# Patient Record
Sex: FEMALE | Race: WHITE | NOT HISPANIC OR LATINO | Employment: FULL TIME
[De-identification: names, ages, dates, MRNs, and addresses within clinical notes are randomized per-mention and may not be internally consistent; named-entity substitution may affect disease eponyms.]

---

## 2019-12-13 ENCOUNTER — TELEPHONE (OUTPATIENT)
Dept: SCHEDULING | Age: 49
End: 2019-12-13

## 2020-11-02 ENCOUNTER — HOSPITAL ENCOUNTER (EMERGENCY)
Facility: HOSPITAL | Age: 50
Discharge: HOME | End: 2020-11-02
Attending: EMERGENCY MEDICINE
Payer: COMMERCIAL

## 2020-11-02 VITALS
WEIGHT: 170 LBS | HEIGHT: 62 IN | OXYGEN SATURATION: 93 % | HEART RATE: 52 BPM | BODY MASS INDEX: 31.28 KG/M2 | DIASTOLIC BLOOD PRESSURE: 79 MMHG | RESPIRATION RATE: 16 BRPM | TEMPERATURE: 98 F | SYSTOLIC BLOOD PRESSURE: 119 MMHG

## 2020-11-02 DIAGNOSIS — M62.830 BACK SPASM: Primary | ICD-10-CM

## 2020-11-02 PROCEDURE — 96374 THER/PROPH/DIAG INJ IV PUSH: CPT

## 2020-11-02 PROCEDURE — 99284 EMERGENCY DEPT VISIT MOD MDM: CPT

## 2020-11-02 PROCEDURE — 96375 TX/PRO/DX INJ NEW DRUG ADDON: CPT

## 2020-11-02 PROCEDURE — 81003 URINALYSIS AUTO W/O SCOPE: CPT | Performed by: EMERGENCY MEDICINE

## 2020-11-02 RX ORDER — HYDROCODONE BITARTRATE AND ACETAMINOPHEN 5; 325 MG/1; MG/1
1 TABLET ORAL EVERY 6 HOURS PRN
Qty: 10 TABLET | Refills: 0 | Status: SHIPPED | OUTPATIENT
Start: 2020-11-02 | End: 2020-11-09

## 2020-11-02 RX ORDER — LIDOCAINE 50 MG/G
1 PATCH TOPICAL EVERY 24 HOURS
Qty: 15 PATCH | Refills: 0 | Status: SHIPPED | OUTPATIENT
Start: 2020-11-02

## 2020-11-02 RX ORDER — ESCITALOPRAM OXALATE 20 MG/1
20 TABLET ORAL DAILY
COMMUNITY

## 2020-11-02 RX ORDER — MORPHINE SULFATE 4 MG/ML
4 INJECTION, SOLUTION INTRAMUSCULAR; INTRAVENOUS ONCE
Status: COMPLETED | OUTPATIENT
Start: 2020-11-02 | End: 2020-11-02

## 2020-11-02 RX ORDER — KETOROLAC TROMETHAMINE 10 MG/1
10 TABLET, FILM COATED ORAL EVERY 6 HOURS PRN
Qty: 20 TABLET | Refills: 0 | Status: SHIPPED | OUTPATIENT
Start: 2020-11-02 | End: 2020-11-09

## 2020-11-02 RX ORDER — CYCLOBENZAPRINE HCL 10 MG
10 TABLET ORAL 3 TIMES DAILY PRN
Qty: 20 TABLET | Refills: 0 | Status: SHIPPED | OUTPATIENT
Start: 2020-11-02 | End: 2020-11-09

## 2020-11-02 RX ORDER — DIAZEPAM 5 MG/ML
2.5 INJECTION, SOLUTION INTRAMUSCULAR; INTRAVENOUS ONCE
Status: COMPLETED | OUTPATIENT
Start: 2020-11-02 | End: 2020-11-02

## 2020-11-02 NOTE — ED PROVIDER NOTES
Patient Seen in: [facility] AND [facility] Emergency Department      History   Patient presents with:  Back Pain    Stated Complaint: lower back pain    HPI    50-year-old female with no significant past medical history here with complaints of acute onset left-s (Oral)   Resp 16   Ht 157.5 cm (5' 2\")   Wt 77.1 kg   SpO2 94%   BMI 31.09 kg/m²         Physical Exam  Vitals signs and nursing note reviewed. HENT:      Head: Normocephalic.       Mouth/Throat:      Mouth: Mucous membranes are moist.   Eyes:      Extra all the ED vitals  - afebrile, hemodynamically stable  - I ordered and personally reviewed the labs and imaging and found no bacteriuria/hematuria  - morphine/valium/lidoderm patch applied  - pain mildly improved  - supportive care discussed    Medical Rec spasms. Qty: 20 tablet Refills: 0    lidocaine 5 % External Patch  Place 1 patch onto the skin daily.   Qty: 15 patch Refills: 0

## 2020-11-02 NOTE — ED INITIAL ASSESSMENT (HPI)
The patient arrived complaining of severe stabbing spasms to her mid to left lower back and nausea since yesterday with report of mid lower back aching 5 days ago that occurred while doing yard work. The patient denies urinary complaints.

## 2020-11-02 NOTE — ED NOTES
PT safe to DC home per MD. Elena Nobles to dress self. DC teaching done, pt verbalizes understanding. Ambulatory with steady gait to exit.

## 2021-04-06 ENCOUNTER — IMMUNIZATION (OUTPATIENT)
Dept: LAB | Age: 51
End: 2021-04-06

## 2021-04-06 DIAGNOSIS — Z23 NEED FOR VACCINATION: Primary | ICD-10-CM

## 2021-04-06 PROCEDURE — 0001A COVID 19 PFIZER-BIONTECH: CPT

## 2021-04-06 PROCEDURE — 91300 COVID 19 PFIZER-BIONTECH: CPT

## 2021-04-28 ENCOUNTER — IMMUNIZATION (OUTPATIENT)
Dept: LAB | Age: 51
End: 2021-04-28
Attending: HOSPITALIST

## 2021-04-28 DIAGNOSIS — Z23 NEED FOR VACCINATION: Primary | ICD-10-CM

## 2021-04-28 PROCEDURE — 0002A COVID 19 PFIZER-BIONTECH: CPT | Performed by: NURSE PRACTITIONER

## 2021-04-28 PROCEDURE — 91300 COVID 19 PFIZER-BIONTECH: CPT | Performed by: NURSE PRACTITIONER

## 2021-11-01 ENCOUNTER — TELEPHONE (OUTPATIENT)
Dept: GASTROENTEROLOGY | Facility: CLINIC | Age: 51
End: 2021-11-01

## 2021-11-01 ENCOUNTER — OFFICE VISIT (OUTPATIENT)
Dept: GASTROENTEROLOGY | Facility: CLINIC | Age: 51
End: 2021-11-01
Payer: COMMERCIAL

## 2021-11-01 VITALS
BODY MASS INDEX: 33.68 KG/M2 | HEIGHT: 62 IN | DIASTOLIC BLOOD PRESSURE: 70 MMHG | WEIGHT: 183 LBS | SYSTOLIC BLOOD PRESSURE: 124 MMHG

## 2021-11-01 DIAGNOSIS — K52.832 LYMPHOCYTIC COLITIS: Primary | ICD-10-CM

## 2021-11-01 DIAGNOSIS — R11.0 NAUSEA: ICD-10-CM

## 2021-11-01 DIAGNOSIS — K21.9 GASTROESOPHAGEAL REFLUX DISEASE, UNSPECIFIED WHETHER ESOPHAGITIS PRESENT: ICD-10-CM

## 2021-11-01 DIAGNOSIS — Z12.11 SCREEN FOR COLON CANCER: Primary | ICD-10-CM

## 2021-11-01 DIAGNOSIS — K52.9 CHRONIC DIARRHEA: ICD-10-CM

## 2021-11-01 PROCEDURE — 3008F BODY MASS INDEX DOCD: CPT | Performed by: INTERNAL MEDICINE

## 2021-11-01 PROCEDURE — 3074F SYST BP LT 130 MM HG: CPT | Performed by: INTERNAL MEDICINE

## 2021-11-01 PROCEDURE — 99244 OFF/OP CNSLTJ NEW/EST MOD 40: CPT | Performed by: INTERNAL MEDICINE

## 2021-11-01 PROCEDURE — 3078F DIAST BP <80 MM HG: CPT | Performed by: INTERNAL MEDICINE

## 2021-11-01 RX ORDER — SODIUM PICOSULFATE, MAGNESIUM OXIDE, AND ANHYDROUS CITRIC ACID 10; 3.5; 12 MG/160ML; G/160ML; G/160ML
1 LIQUID ORAL ONCE
Qty: 1 EACH | Refills: 0 | Status: SHIPPED | OUTPATIENT
Start: 2021-11-01 | End: 2021-11-01

## 2021-11-01 RX ORDER — BUDESONIDE 3 MG/1
CAPSULE, COATED PELLETS ORAL
Qty: 90 CAPSULE | Refills: 1 | Status: SHIPPED | OUTPATIENT
Start: 2021-11-01

## 2021-11-01 NOTE — PATIENT INSTRUCTIONS
GI schedulers –    Please schedule colonoscopy exam at [facility] [address] [facility]    Body mass index is 33.47 kg/m².     MAC anesthesia if EOSC or NELM    Clenpiq small volume bowel prep if covered by insurance, otherwise   Golytely (PEG)

## 2021-11-01 NOTE — TELEPHONE ENCOUNTER
Scheduled for:  Colonoscopy 87773  Provider Name:  Dr. Yann Magaña  Date:  2/8/2022  Location:  [facility]  Sedation:  MAC  Time:  10:30 am, arrival 9:30 am  Prep:  Clenpiq  Meds/Allergies Reconciled?:  Physician reviewed  Diagnosis with codes:  Screening for colon ca

## 2021-11-01 NOTE — PROGRESS NOTES
HPI:    Patient ID: Kimberly Bundy is a 45year old woman with elevated BMI 33.5, looking very healthy who gave up smoking in 2016. Abdominal surgical history is limited to a total abdominal hysterectomy 2014.     Ms. Salvador Morgan is referred to us by Dr. Kajal Emery mucus  · Abdominal cramping and severe bodily, muscular cramping including nocturnal leg and calf cramps  · GERD symptoms and nausea accompany the colitis flares    Likely lactose intolerance; dairy will frequently trigger diarrhea    Family history signif Dysplasia of cervix (uteri)   cryo age 23   • Lymphocytic colitis 6/16/2015   • Tinea corporis 6/21/2015     History     Social History   • Marital Status:    Spouse Name: N/A   Number of Children: 1   • Years of Education: N/A     Occupational Hist splenic flexure, transverse colon,  hepatic flexure, ascending colon, cecum, appendiceal orifice, ileocecal valve  and terminal ileum are normal. This was biopsied to evaluate for  microscopic colitis.   - Atrophic and fissured appearing rectal mucosa, with stomach flu. Thinks she had similar symptoms in her 25s. Had c/scope and was diagnosed with lymphocytic colitis. Was given six week course of budesonide and noted significant improvement with 1-2 solid stools per day. Nausea was improved as well.  No abdomi 2016.    Abdominal surgical history is limited to a total abdominal hysterectomy 2014. Ms. Jennie Gauthier is referred to us by Dr. Yarelis Menard for follow-up of lymphocytic colitis. As per limited notes reviewed and copied below, Ms. Hansel Olmedo diarrhea with mucus, 6–7 bowel movements per day along with GERD symptoms and nausea  Flareups appear to relate to stress, NSAID use  Appears to be back at at asymptomatic baseline on initial consultation 11/1/2021 after about 3 months of watery diarrhea t minutes spent today discussing the above and counseling this patient, reviewing chart notes and data and composing this note. Digital transcription software was utilized to produce this note. The note was proofread for content only.  Typographical error

## 2022-01-27 ENCOUNTER — EMPLOYEE HEALTH (OUTPATIENT)
Dept: OTHER | Age: 52
End: 2022-01-27

## 2022-02-02 ENCOUNTER — WALK IN (OUTPATIENT)
Dept: URGENT CARE | Age: 52
End: 2022-02-02

## 2022-02-02 VITALS
BODY MASS INDEX: 33.13 KG/M2 | TEMPERATURE: 98.7 F | SYSTOLIC BLOOD PRESSURE: 118 MMHG | DIASTOLIC BLOOD PRESSURE: 84 MMHG | OXYGEN SATURATION: 98 % | WEIGHT: 180 LBS | HEIGHT: 62 IN | HEART RATE: 64 BPM

## 2022-02-02 DIAGNOSIS — J02.9 SORE THROAT: ICD-10-CM

## 2022-02-02 DIAGNOSIS — J01.80 OTHER ACUTE SINUSITIS, RECURRENCE NOT SPECIFIED: ICD-10-CM

## 2022-02-02 DIAGNOSIS — J34.89 SINUS PAIN: Primary | ICD-10-CM

## 2022-02-02 LAB
INTERNAL PROCEDURAL CONTROLS ACCEPTABLE: YES
S PYO AG THROAT QL IA.RAPID: NEGATIVE
SARS-COV+SARS-COV-2 AG RESP QL IA.RAPID: NOT DETECTED

## 2022-02-02 PROCEDURE — 99202 OFFICE O/P NEW SF 15 MIN: CPT | Performed by: NURSE PRACTITIONER

## 2022-02-02 PROCEDURE — 87426 SARSCOV CORONAVIRUS AG IA: CPT | Performed by: NURSE PRACTITIONER

## 2022-02-02 PROCEDURE — 87880 STREP A ASSAY W/OPTIC: CPT | Performed by: NURSE PRACTITIONER

## 2022-02-02 RX ORDER — CLONAZEPAM 0.5 MG/1
TABLET ORAL
COMMUNITY

## 2022-02-02 RX ORDER — TRAZODONE HYDROCHLORIDE 50 MG/1
TABLET ORAL
COMMUNITY
Start: 2022-01-26

## 2022-02-02 RX ORDER — ESCITALOPRAM OXALATE 20 MG/1
20 TABLET ORAL DAILY
COMMUNITY
Start: 2022-01-26

## 2022-02-02 RX ORDER — AMOXICILLIN AND CLAVULANATE POTASSIUM 875; 125 MG/1; MG/1
1 TABLET, FILM COATED ORAL 2 TIMES DAILY
Qty: 14 TABLET | Refills: 0 | Status: SHIPPED | OUTPATIENT
Start: 2022-02-02 | End: 2022-02-09

## 2022-02-02 ASSESSMENT — ENCOUNTER SYMPTOMS
TROUBLE SWALLOWING: 0
FACIAL ASYMMETRY: 0
SINUS PRESSURE: 1
RHINORRHEA: 0
SORE THROAT: 1
EYES NEGATIVE: 1
HEADACHES: 1
RESPIRATORY NEGATIVE: 1
CONSTITUTIONAL NEGATIVE: 1
DIZZINESS: 0
ENDOCRINE NEGATIVE: 1
LIGHT-HEADEDNESS: 0
GASTROINTESTINAL NEGATIVE: 1
ALLERGIC/IMMUNOLOGIC NEGATIVE: 1
WEAKNESS: 0
SINUS PAIN: 1
VOICE CHANGE: 0

## 2022-02-02 ASSESSMENT — PAIN SCALES - GENERAL: PAINLEVEL: 6

## 2022-02-03 ENCOUNTER — TELEPHONE (OUTPATIENT)
Dept: GASTROENTEROLOGY | Facility: CLINIC | Age: 52
End: 2022-02-03

## 2022-02-03 NOTE — TELEPHONE ENCOUNTER
PAT/ENDO note:      Cancelled colonoscopy on 2/8/22-pt had tested positive for covid 1/23/22 Cancelled per anesthesia protocol

## 2022-02-03 NOTE — TELEPHONE ENCOUNTER
Please call and reschedule.       CANCELLED for:  Colonoscopy 08887  Provider Name:  Dr. Ruba Long  Date:  2/8/2022  Location:  [facility]  Sedation:  MAC  Time:  10:30 am

## 2022-02-04 NOTE — TELEPHONE ENCOUNTER
Pt states she just spoke to  today but that she needs to reschedule the procedure again.  Please call

## 2022-02-04 NOTE — TELEPHONE ENCOUNTER
Left Message To Call Back     PHONE ROOM STAFF, please transfer call to [address] [facility]    Thank you

## 2022-02-04 NOTE — TELEPHONE ENCOUNTER
I contacted patient and RE-scheduled CLN procedure. Patient agreed to MyChart instructions    Scheduled for:  Colonoscopy 15232  Provider Name: Dr Dinora Strickland   Date:  Wed 6/29/2022  Location:  [facility]  Sedation: MAC   Time:  2 pm, (pt is aware that [facility] will call the day before to confirm arrival time)  Prep: Clenpiq  Meds/Allergies Reconciled?:    Diagnosis with codes: CRC screening Z12.11  Was patient informed to call insurance with codes (Y/N):  yes  Referral sent?:    [address] [facility] or [address] [place] [address] notified?:  Electronic case request was sent to [facility] via [employer]. Medication Orders: Pt is aware to NOT take iron pills, herbal meds and diet supplements for 7 days before exam. Also to NOT take any form of alcohol, recreational drugs and any forms of ED meds 24 hours before exam.      Misc Orders:  Patient was informed that they will need a COVID 19 test prior to their procedure. Patient verbally understood & will await a phone call from [facility] to schedule. Further instructions given by staff:   Instructions given and pt verbalized understanding

## 2022-02-07 NOTE — TELEPHONE ENCOUNTER
Rescheduled for:  Colonoscopy - 72081    Provider Name:  Dr. Danna Ho  Date:  FROM - 6/29/22                       TO - 9/13/22  Location:  FROM - [facility]                      TO [facility]  Sedation:  MAC  Time:  FROM - 2:00 pm                       TO - 12:30 pm (pt is aware to arrive at 11:30 am)  Prep:  Clenpiq, Prep instructions were given to pt over the phone, pt verbalized understanding. Meds/Allergies Reconciled?:  Yes, Physician reviewed    Diagnosis with codes:  Colon screening - Z12.11  Was patient informed to call insurance with codes (Y/N):  Yes, I confirmed BCBS PPO insurance with this patient. Referral sent?:  No, no PA needed per notes. [facility] or [address] notified?:  Yes, Electronic case CANCEL request was sent to [facility] via [employer]. I sent a NEW electronic request to Endo Scheduling and received a confirmation today. Medication Orders:  N/A     Misc Orders:  Patient was informed that they will need a COVID 19 test prior to their procedure. Patient verbally understood & will await a phone call from [facility] to schedule. Further instructions given by staff:  I discussed the prep instructions with the patient which she verbally understood and is aware that I will send the instructions via [employer].

## 2022-04-13 ENCOUNTER — TELEPHONE (OUTPATIENT)
Dept: GASTROENTEROLOGY | Facility: CLINIC | Age: 52
End: 2022-04-13

## 2022-04-13 RX ORDER — BUDESONIDE 3 MG/1
CAPSULE, COATED PELLETS ORAL
Qty: 90 CAPSULE | Refills: 0 | Status: SHIPPED | OUTPATIENT
Start: 2022-04-13

## 2022-04-13 NOTE — TELEPHONE ENCOUNTER
I spoke to Nimo Janehumhprey    She is having a colitis flare that started early Monday morning    She has been under a lot of stress lately    Her father passed away in January    Her usual bowel routine is one formed bowel movement/day    Now she is having about 4-6 non bloody diarrhea stools/day    There is a lot of mucous in the stool and she is experiencing a lot of gas    No nocturnal urges  The diarrhea is worse in the morning and improves during the day    She is nauseated as well    Experiencing rectal burning due to the diarrhea    Pt asking for budesonide    Verified Evette in [employer]

## 2022-04-13 NOTE — TELEPHONE ENCOUNTER
Recent office notes reviewed; colonoscopy examination for screening and follow-up of the lymphocytic colitis was canceled in February due to positive Covid test.  Appears to be rescheduled for 9/13/2022. Good response to budesonide previously. 30-day prescription for budesonide sent in to [facility].

## 2022-04-13 NOTE — TELEPHONE ENCOUNTER
Patient is calling because she is having colitis flare up. Having symptoms for 3 days getting worse. Patient was requesting some medication for relief.

## 2022-08-05 ENCOUNTER — TELEPHONE (OUTPATIENT)
Dept: GASTROENTEROLOGY | Facility: CLINIC | Age: 52
End: 2022-08-05

## 2022-08-05 NOTE — TELEPHONE ENCOUNTER
Patient requesting Preps instructions for 9/13/2022 CLN to be sent to [employer] and also requesting it to be mailed to home address:    [address]    For additional questions please call. Thank you.

## 2022-09-13 ENCOUNTER — ANESTHESIA EVENT (OUTPATIENT)
Dept: ENDOSCOPY | Facility: HOSPITAL | Age: 52
End: 2022-09-13
Payer: COMMERCIAL

## 2022-09-13 ENCOUNTER — ANESTHESIA (OUTPATIENT)
Dept: ENDOSCOPY | Facility: HOSPITAL | Age: 52
End: 2022-09-13
Payer: COMMERCIAL

## 2022-09-13 ENCOUNTER — HOSPITAL ENCOUNTER (OUTPATIENT)
Facility: HOSPITAL | Age: 52
Setting detail: HOSPITAL OUTPATIENT SURGERY
Discharge: HOME | End: 2022-09-13
Attending: INTERNAL MEDICINE | Admitting: INTERNAL MEDICINE
Payer: COMMERCIAL

## 2022-09-13 VITALS
HEART RATE: 55 BPM | RESPIRATION RATE: 15 BRPM | OXYGEN SATURATION: 98 % | WEIGHT: 170 LBS | HEIGHT: 62 IN | DIASTOLIC BLOOD PRESSURE: 86 MMHG | SYSTOLIC BLOOD PRESSURE: 129 MMHG | BODY MASS INDEX: 31.28 KG/M2 | TEMPERATURE: 97 F

## 2022-09-13 DIAGNOSIS — Z12.11 COLON CANCER SCREENING: ICD-10-CM

## 2022-09-13 PROCEDURE — 0DBE8ZX EXCISION OF LARGE INTESTINE, VIA NATURAL OR ARTIFICIAL OPENING ENDOSCOPIC, DIAGNOSTIC: ICD-10-PCS | Performed by: INTERNAL MEDICINE

## 2022-09-13 PROCEDURE — 0DBB8ZX EXCISION OF ILEUM, VIA NATURAL OR ARTIFICIAL OPENING ENDOSCOPIC, DIAGNOSTIC: ICD-10-PCS | Performed by: INTERNAL MEDICINE

## 2022-09-13 PROCEDURE — 45380 COLONOSCOPY AND BIOPSY: CPT | Performed by: INTERNAL MEDICINE

## 2022-09-13 RX ORDER — SODIUM CHLORIDE, SODIUM LACTATE, POTASSIUM CHLORIDE, CALCIUM CHLORIDE 600; 310; 30; 20 MG/100ML; MG/100ML; MG/100ML; MG/100ML
INJECTION, SOLUTION INTRAVENOUS CONTINUOUS
Status: DISCONTINUED | OUTPATIENT
Start: 2022-09-13 | End: 2022-09-13

## 2022-09-13 RX ORDER — LIDOCAINE HYDROCHLORIDE 10 MG/ML
INJECTION, SOLUTION EPIDURAL; INFILTRATION; INTRACAUDAL; PERINEURAL AS NEEDED
Status: DISCONTINUED | OUTPATIENT
Start: 2022-09-13 | End: 2022-09-13 | Stop reason: SURG

## 2022-09-13 RX ADMIN — LIDOCAINE HYDROCHLORIDE 50 MG: 10 INJECTION, SOLUTION EPIDURAL; INFILTRATION; INTRACAUDAL; PERINEURAL at 12:27:00

## 2022-09-13 RX ADMIN — SODIUM CHLORIDE, SODIUM LACTATE, POTASSIUM CHLORIDE, CALCIUM CHLORIDE: 600; 310; 30; 20 INJECTION, SOLUTION INTRAVENOUS at 12:59:00

## 2022-09-13 RX ADMIN — SODIUM CHLORIDE, SODIUM LACTATE, POTASSIUM CHLORIDE, CALCIUM CHLORIDE: 600; 310; 30; 20 INJECTION, SOLUTION INTRAVENOUS at 12:26:00

## 2022-09-13 NOTE — ANESTHESIA POSTPROCEDURE EVALUATION
Patient: Paul Lev    Procedure Summary     Date: 09/13/22 Room / Location: [address] [place] [address] ENDOSCOPY 05 / [address] [place] [address] ENDOSCOPY    Anesthesia Start: 1226 Anesthesia Stop:     Procedure: COLONOSCOPY (N/A ) Diagnosis:       Colon cancer screening      (diverticulosis, )    Surgeons: Nancy Schilling MD Anesthesiologist: Sonny Nguyen CRNA    Anesthesia Type: MAC ASA Status: 1          Anesthesia Type: MAC    Vitals Value Taken Time   BP 90/60 09/13/22 1303   Temp  09/13/22 1303   Pulse 61 09/13/22 1303   Resp 20 09/13/22 1303   SpO2 97 % 09/13/22 1303       [facility] AN Post Evaluation:   Patient Evaluated in Patient location: Endo recovery.   Patient Participation: complete - patient participated  Level of Consciousness: awake and alert  Pain Score: 0  Pain Management: adequate  Airway Patency:patent  Yes    Cardiovascular Status: acceptable  Respiratory Status: acceptable  Postoperative Hydration acceptable      María Rowe CRNA  9/13/2022 1:03 PM

## 2022-09-23 ENCOUNTER — MED REC SCAN ONLY (OUTPATIENT)
Dept: GASTROENTEROLOGY | Facility: CLINIC | Age: 52
End: 2022-09-23

## 2022-10-03 ENCOUNTER — TELEPHONE (OUTPATIENT)
Dept: GASTROENTEROLOGY | Facility: CLINIC | Age: 52
End: 2022-10-03

## 2022-10-03 NOTE — TELEPHONE ENCOUNTER
Results letter mailed to patient per    Colon recall entered for repeat in 10 yrs ,09/13/2032  Colon done in 09/13/2022  HM Updated and Patient Outreach was placed for Colon recall    Diamond Ledezma MD  P [facility] Gi Clinical Staff  GI RNs - 1.  Please print and mail this letter to patient; 2. Recall for colonoscopy exam in 10 years

## 2023-07-05 ENCOUNTER — PATIENT MESSAGE (OUTPATIENT)
Facility: CLINIC | Age: 53
End: 2023-07-05

## 2023-07-05 NOTE — TELEPHONE ENCOUNTER
From: Jonn Sandhoff  To: Cristobal Dove MD  Sent: 7/5/2023 7:47 AM CDT  Subject: Blood    I had two episodes of watery diarrhea with bloody mucus on Sunday. It is common for me to have watery diarrhea but I have never had blood. No blood since Sunday. Bowel movements are somewhat better. Is this something I need to have addressed or can I just wait and see? I did feel like I had a slight fever the night before. No pain, just my regular GI cramping, gas, bloating. Thank you.  I can be reached at [phone]

## 2023-07-05 NOTE — TELEPHONE ENCOUNTER
Dr Fina Myers,    I called and spoke to [facility]    No new updates from this mornings [employer] message    She has had no further episodes of bleeding    No bowel movements today    When she did have the blood with the stool she noticed the blood when she wiped and in the toilet.  Blood is also with mucous    She is having no pain    She was using some NSAIDS for hip pain but not a lot    She had a cortisone shot on Friday    She will watch for now,  and call or [employer] message again if symptoms worsen    She did take budesonide in the past for flares

## 2023-08-16 ENCOUNTER — OFFICE VISIT (OUTPATIENT)
Dept: FAMILY MEDICINE CLINIC | Facility: CLINIC | Age: 53
End: 2023-08-16

## 2023-08-16 VITALS
BODY MASS INDEX: 33.93 KG/M2 | HEART RATE: 74 BPM | DIASTOLIC BLOOD PRESSURE: 80 MMHG | SYSTOLIC BLOOD PRESSURE: 118 MMHG | WEIGHT: 184.38 LBS | HEIGHT: 62 IN

## 2023-08-16 DIAGNOSIS — F41.9 ANXIETY: ICD-10-CM

## 2023-08-16 DIAGNOSIS — N39.41 URGE INCONTINENCE: ICD-10-CM

## 2023-08-16 DIAGNOSIS — Z76.89 ENCOUNTER TO ESTABLISH CARE: Primary | ICD-10-CM

## 2023-08-16 DIAGNOSIS — N89.8 VAGINAL DRYNESS: ICD-10-CM

## 2023-08-16 LAB
APPEARANCE: CLEAR
BILIRUBIN: NEGATIVE
GLUCOSE (URINE DIPSTICK): NEGATIVE MG/DL
KETONES (URINE DIPSTICK): NEGATIVE MG/DL
LEUKOCYTES: NEGATIVE
MULTISTIX LOT#: NORMAL NUMERIC
NITRITE, URINE: NEGATIVE
OCCULT BLOOD: NEGATIVE
PH, URINE: 5.5 (ref 4.5–8)
PROTEIN (URINE DIPSTICK): NEGATIVE MG/DL
SPECIFIC GRAVITY: 1.02 (ref 1–1.03)
URINE-COLOR: YELLOW
UROBILINOGEN,SEMI-QN: 0.2 MG/DL (ref 0–1.9)

## 2023-08-16 PROCEDURE — 3074F SYST BP LT 130 MM HG: CPT | Performed by: FAMILY MEDICINE

## 2023-08-16 PROCEDURE — 3008F BODY MASS INDEX DOCD: CPT | Performed by: FAMILY MEDICINE

## 2023-08-16 PROCEDURE — 81003 URINALYSIS AUTO W/O SCOPE: CPT | Performed by: FAMILY MEDICINE

## 2023-08-16 PROCEDURE — 3079F DIAST BP 80-89 MM HG: CPT | Performed by: FAMILY MEDICINE

## 2023-08-16 PROCEDURE — 99203 OFFICE O/P NEW LOW 30 MIN: CPT | Performed by: FAMILY MEDICINE

## 2023-08-16 RX ORDER — ESTRADIOL 10 UG/1
INSERT VAGINAL
COMMUNITY
Start: 2023-08-10

## 2023-08-16 RX ORDER — ESCITALOPRAM OXALATE 20 MG/1
20 TABLET ORAL DAILY
Qty: 90 TABLET | Refills: 4 | Status: SHIPPED | OUTPATIENT
Start: 2023-08-16

## 2023-08-16 RX ORDER — MIRABEGRON 25 MG/1
25 TABLET, FILM COATED, EXTENDED RELEASE ORAL DAILY
Qty: 90 TABLET | Refills: 0 | Status: SHIPPED | OUTPATIENT
Start: 2023-08-16

## 2023-08-16 RX ORDER — TRAZODONE HYDROCHLORIDE 100 MG/1
100 TABLET ORAL NIGHTLY
Qty: 90 TABLET | Refills: 3 | Status: SHIPPED | OUTPATIENT
Start: 2023-08-16 | End: 2024-08-10

## 2024-07-21 ENCOUNTER — HOSPITAL ENCOUNTER (OUTPATIENT)
Age: 54
Discharge: HOME | End: 2024-07-21
Payer: COMMERCIAL

## 2024-07-21 VITALS
TEMPERATURE: 98 F | OXYGEN SATURATION: 98 % | DIASTOLIC BLOOD PRESSURE: 84 MMHG | RESPIRATION RATE: 16 BRPM | SYSTOLIC BLOOD PRESSURE: 112 MMHG | HEART RATE: 79 BPM

## 2024-07-21 DIAGNOSIS — R51.9 CHRONIC DAILY HEADACHE: Primary | ICD-10-CM

## 2024-07-21 RX ORDER — BUTALBITAL, ACETAMINOPHEN AND CAFFEINE 50; 325; 40 MG/1; MG/1; MG/1
1 TABLET ORAL EVERY 6 HOURS PRN
Qty: 20 TABLET | Refills: 0 | Status: SHIPPED | OUTPATIENT
Start: 2024-07-21

## 2024-07-21 RX ORDER — SUMATRIPTAN 50 MG/1
50 TABLET, FILM COATED ORAL EVERY 6 HOURS PRN
Qty: 10 TABLET | Refills: 0 | Status: SHIPPED | OUTPATIENT
Start: 2024-07-21

## 2024-07-21 NOTE — ED PROVIDER NOTES
Patient Seen in: [facility]      History     Chief Complaint   Patient presents with    Headache     Stated Complaint: HEADACHES    Subjective:   HPI    Patient is a 53-year-old  female with GERD, anxiety, lymphocytic colitis, sensorineural hearing loss left ear, chronic headaches, presenting to immediate care for evaluation of headaches.   has had chronic headaches as a small child.   for the last 3-4 weeks has been having almost daily headaches that are moderate to severe.  Headaches are bilaterally temporal region.  Tight and squeezing sensation.  Pain radiating to neck.  Occasional associated nausea, vomiting, and chills.  No improvement with ibuprofen.  Occasional improvement with acetaminophen however headaches recur.  Consider giving intensity and ongoing problem.  Unable to see primary doctor until October 2024.  [facility] has seen prior specialists in the past including ophthalmology which not related to eye problem.Remote saw dentist for possible underlying TMJ.  Do not believe it has related.  In addition has had Botox injection which had no improvement and had associated angio edema evaluation.  Patient has not followed up with neurologist.  She is coming to immediate care for evaluation and medication relief of symptoms.  She denies any vision changes.  No double vision.  No lightheadedness or dizziness or confusion.  No slurred speech or facial droop.  No extremity numbness weakness paresthesias.  No gait dysfunction.  No associated trauma.  Not immunocompromise.  No fevers.    Objective:   Past Medical History:    Allergic rhinitis    Anal fissure    Anxiety    Anxiety state    Colitis    lymphocytic colitis    Hearing impairment    Irritable bowel syndrome    Obesity    Osteoarthritis    PONV (postoperative nausea and vomiting)    Visual impairment              Past Surgical History:   Procedure Laterality Date    Colonoscopy  2013    Lymphocytic colitis    Colonoscopy       Colonoscopy N/A 2022    Procedure: COLONOSCOPY;  Surgeon: Elio Mott MD;  Location: [facility] ENDOSCOPY    Hysterectomy            Total abdom hysterectomy                  Social History     Socioeconomic History    Marital status:    Tobacco Use    Smoking status: Former     Current packs/day: 0.00     Types: Cigarettes     Start date: 10/1/2016     Quit date: 10/1/2016     Years since quittin.8    Smokeless tobacco: Never   Vaping Use    Vaping status: Every Day   Substance and Sexual Activity    Alcohol use: Yes     Alcohol/week: 2.0 standard drinks of alcohol     Types: 2 Standard drinks or equivalent per week     Comment: occ    Drug use: Never              Review of Systems   Constitutional:  Positive for activity change and chills. Negative for fever and unexpected weight change.   Respiratory:  Negative for shortness of breath.    Cardiovascular:  Negative for chest pain.   Gastrointestinal:  Positive for nausea. Negative for abdominal pain and vomiting.   Skin:  Negative for rash.   Allergic/Immunologic: Negative for immunocompromised state.   Neurological:  Positive for headaches. Negative for dizziness and light-headedness.   Hematological:  Negative for adenopathy. Does not bruise/bleed easily.   Psychiatric/Behavioral:  Positive for sleep disturbance. Negative for confusion. The patient is nervous/anxious.        Positive for stated Chief Complaint: Headache    Other systems are as noted in HPI.  Constitutional and vital signs reviewed.      All other systems reviewed and negative except as noted above.    Physical Exam     ED Triage Vitals [24 0903]   /84   Pulse 79   Resp 16   Temp 98.2 °F (36.8 °C)   Temp src Temporal   SpO2 98 %   O2 Device None (Room air)       Current Vitals:   Vital Signs  BP: 112/84  Pulse: 79  Resp: 16  Temp: 98.2 °F (36.8 °C)  Temp src: Temporal    Oxygen Therapy  SpO2: 98 %  O2 Device: None (Room air)            Physical  Exam  Vitals and nursing note reviewed.   Constitutional:       General: She is not in acute distress.     Appearance: Normal appearance. She is not ill-appearing, toxic-appearing or diaphoretic.   HENT:      Head: Normocephalic and atraumatic.      Mouth/Throat:      Mouth: Mucous membranes are moist.   Eyes:      Conjunctiva/sclera: Conjunctivae normal.   Cardiovascular:      Rate and Rhythm: Normal rate.      Pulses: Normal pulses.   Pulmonary:      Effort: No respiratory distress.   Musculoskeletal:         General: No deformity. Normal range of motion.   Neurological:      General: No focal deficit present.      Mental Status: She is alert and oriented to person, place, and time.      GCS: GCS eye subscore is 4. GCS verbal subscore is 5. GCS motor subscore is 6.      Cranial Nerves: No cranial nerve deficit or dysarthria.      Motor: No weakness, tremor, atrophy, abnormal muscle tone or seizure activity.      Coordination: Coordination is intact. Coordination normal.      Gait: Gait normal.   Psychiatric:         Mood and Affect: Mood normal.         Behavior: Behavior normal.             ED Course   Labs Reviewed - No data to display             MDM       Patient is a 53-year-old female, presenting to immediate care for evaluation of ongoing chronic daily headaches.  States now affecting her daily living.  Worsening the last 3-4 weeks.  Associated: nausea, chills.  Unrelieved with ibuprofen.  GI upset with Aleve.  Acetaminophen with some improvement however however recurrence of symptoms.  Unable to see primary until October 2024.  Here for initial evaluation and medication relief.  Patient alert, cooperative, pleasant, nontoxic-appearing, appears in acute distress.  Hemodynamically stable.  Afebrile.  Nontachycardic not hypoxic.  Patient with benign nonfocal logical exam.  Normal gait.  No gross focal deficit or weakness.  No confusion.  Discussed limitations of immediate care evaluation.  Patient requires  advanced imaging MRI for evaluation of headaches.  No prior CT/brain imaging.  Have low suspicion for intracranial process such as acute bleed, CVA, TIA, etc.  Shared decision making.  Will prescribe Fioricet and triptan for symptoatic relief.  Patient's symptoms are somewhat consistent with migraine type headache.  Discussed ER evaluation which declining at this time.  Plans to follow-up with primary and outpatient neurology follow-up provided.  ED return precautions.  Stable for discharge                                   Medical Decision Making      Disposition and Plan     Clinical Impression:  1. Chronic daily headache         Disposition:  Discharge  7/21/2024  9:28 am    Follow-up:  Ceci Melvin,   [address] [facility] [address]  [facility] [address]  [phone]    Schedule an appointment as soon as possible for a visit   Neuorlogist As needed    Tesha Enriquez MD  [address] [place] [address] [address]  [facility] [address]  [phone]                Medications Prescribed:  Discharge Medication List as of 7/21/2024  9:29 AM        START taking these medications    Details   butalbital-acetaminophen-caffeine -40 MG Oral Tab Take 1 tablet by mouth every 6 (six) hours as needed for Pain or Headaches., Normal, Disp-20 tablet, R-0      SUMAtriptan 50 MG Oral Tab Take 1 tablet (50 mg total) by mouth every 6 (six) hours as needed for Migraine. No more than 4 tablets/24 hours., Normal, Disp-10 tablet, R-0

## 2024-07-21 NOTE — ED INITIAL ASSESSMENT (HPI)
Headaches on and off for 3-4 weeks,no dizziness, no blurry vision, states sometimes radiates to neck and shoulders, no arm weakness, no facial droop, speech clear, no urin symptoms, had nausea and chills last night, no cp, no sob

## 2024-09-10 ENCOUNTER — OFFICE VISIT (OUTPATIENT)
Dept: NEUROLOGY | Facility: CLINIC | Age: 54
End: 2024-09-10
Payer: COMMERCIAL

## 2024-09-10 VITALS
BODY MASS INDEX: 29 KG/M2 | WEIGHT: 156 LBS | SYSTOLIC BLOOD PRESSURE: 112 MMHG | DIASTOLIC BLOOD PRESSURE: 68 MMHG | HEART RATE: 68 BPM | RESPIRATION RATE: 16 BRPM

## 2024-09-10 DIAGNOSIS — R11.0 NAUSEA: ICD-10-CM

## 2024-09-10 DIAGNOSIS — Z51.81 MEDICATION MONITORING ENCOUNTER: ICD-10-CM

## 2024-09-10 DIAGNOSIS — G43.719 INTRACTABLE CHRONIC MIGRAINE WITHOUT AURA AND WITHOUT STATUS MIGRAINOSUS: ICD-10-CM

## 2024-09-10 DIAGNOSIS — R51.9 NEW ONSET HEADACHE: Primary | ICD-10-CM

## 2024-09-10 PROCEDURE — 99204 OFFICE O/P NEW MOD 45 MIN: CPT | Performed by: STUDENT IN AN ORGANIZED HEALTH CARE EDUCATION/TRAINING PROGRAM

## 2024-09-10 PROCEDURE — 3074F SYST BP LT 130 MM HG: CPT | Performed by: STUDENT IN AN ORGANIZED HEALTH CARE EDUCATION/TRAINING PROGRAM

## 2024-09-10 PROCEDURE — 3078F DIAST BP <80 MM HG: CPT | Performed by: STUDENT IN AN ORGANIZED HEALTH CARE EDUCATION/TRAINING PROGRAM

## 2024-09-10 RX ORDER — UBROGEPANT 100 MG/1
TABLET ORAL
Qty: 30 TABLET | Refills: 3 | Status: SHIPPED | OUTPATIENT
Start: 2024-09-10 | End: 2025-09-10

## 2024-09-10 RX ORDER — UBROGEPANT 100 MG/1
TABLET ORAL
Qty: 3 TABLET | Refills: 0 | COMMUNITY
Start: 2024-09-10 | End: 2025-09-10

## 2024-09-10 RX ORDER — TIRZEPATIDE 7.5 MG/.5ML
7.5 INJECTION, SOLUTION SUBCUTANEOUS WEEKLY
COMMUNITY
Start: 2024-08-16

## 2024-09-10 NOTE — H&P
Neurology New Office Visit    Al Noonan   Date of Birth 8/30/1970    Subjective:  Al Noonan is a(n) 54 year old female with a medical history of lymphocytic colitis, hysterectomy, hearing impairment (left side, unsure why, had CT) who presents for headache.     Per pt headahce history as follows.     Brief headache history:   -Onset: 6/2024  -Provokers at onset: no infections, trauma, new meds, change in stress  -Character: squeezing throbbing pain in temples, sometimes escalate with nausea, sometimes chills in setting of nausea  -Associated symptoms: facial pain like muscle tension, no visual aura, eyes will feel tired (had eyes checked), photophobia, smell sensitivity, no phonophobia, no vomiting some close calls. No focal weakness, no sensory symptoms, no trouble walking, no language issues, no slurred speech, no bowel/bladder issues, no loc.   -Timing: last for days  -Evolution: fewer severe lately, worse in morning and afternoon  -Sleep: takes trazodone for sleep, chronic insomnia  -Frequency: daily since summer 2024 but some days worse than others, severe days ~4 days per week  -Triggers: smells  -Imaging: no  -Family history: no family history of migraine  -Current medications:   -Abortive:     -Tylenol- intermittently helps, but not for severe and wears off    -Fioricet- didn't work    -Sumatriptan- helped some, then headaches rebounded    -Ibuprofen- didn't help    -Motrin- didn't help     -Excedrin- didn't help    -Tries to stay away from NSAIDs in setting of colitis   -Preventive:   -Prior  medications:    -Abortive:    -Preventive:   -Non-pharmacologic palliation: massage, mouthguard made, heating pad, feels like needs to lay in dark room, walking helps  -Debility: Has had to go to urgent care for headaches, she has had to miss social events for them, work has been very trouble    Reports she has had headaches in the past but none disabling.     History of trying botox for tmj, reports eye  got very swollen.     Problem List:  There is no problem list on file for this patient.      PMHx:  Past Medical History:    Allergic rhinitis    Anal fissure    Anxiety    Anxiety state    Colitis    lymphocytic colitis    Hearing impairment    Irritable bowel syndrome    Obesity    Osteoarthritis    PONV (postoperative nausea and vomiting)    Visual impairment       PSHx:  Past Surgical History:   Procedure Laterality Date    Colonoscopy  2013    Lymphocytic colitis    Colonoscopy      Colonoscopy N/A 2022    Procedure: COLONOSCOPY;  Surgeon: Elio Mott MD;  Location: [facility] ENDOSCOPY    Hysterectomy            Total abdom hysterectomy         SocHx:  Social History     Socioeconomic History    Marital status:    Tobacco Use    Smoking status: Former     Current packs/day: 0.00     Types: Cigarettes     Start date: 10/1/2016     Quit date: 10/1/2016     Years since quittin.9    Smokeless tobacco: Never   Vaping Use    Vaping status: Every Day   Substance and Sexual Activity    Alcohol use: Yes     Alcohol/week: 2.0 standard drinks of alcohol     Types: 2 Standard drinks or equivalent per week     Comment: occ    Drug use: Never   Other Topics Concern    Caffeine Concern Yes     Comment: coffee 2 cups    Exercise Yes     Comment: walking   She walks a lot. Social alcohol. Quit smoking years ago. No recreational drugs. Drinks some caffeine, daily. Eats a pretty balanced diet, taking zepbound for weight loss (started in January, tried going off it which didn't make difference in headache). Eating quite well, balanced. Lots of caregiver duties, parents (now ), spouse (strokes).     Family History:  Family History   Problem Relation Age of Onset    Cancer Mother         Lung Cancer    Cancer Father         Bladder cancer    Cancer Maternal Grandfather         Colon cancer    Cancer Paternal Grandmother         Breast cancer   No family history of neurologic issues.      Current Medications:  Current Outpatient Medications   Medication Sig Dispense Refill    ZEPBOUND 7.5 MG/0.5ML Subcutaneous Solution Auto-injector Inject 7.5 mg into the skin once a week.      ubrogepant (UBRELVY) 100 MG Oral Tab Take one tablet at onset of migraine.  May take additional tablet in 2 hours if needed.  Do not exceed two tablets per 24 hour period. 30 tablet 3    Estradiol 10 MCG Vaginal Tab PLACE 1 TABLET VAGINALLY TWICE A WEEK ON MONDAY AND THURSDAY      escitalopram 20 MG Oral Tab Take 1 tablet (20 mg total) by mouth daily. 90 tablet 4    clonazePAM (KLONOPIN OR) Take by mouth as needed.      traZODone 100 MG Oral Tab Take 1 tablet (100 mg total) by mouth as needed.     Doesn't take clonazepam often.     ROS:  She has lost ~25 pounds since January. Colitis hasn't been active recently. Chills with nausea and headaches. No night sweats.     Objective/Physical Exam:    Vital Signs:  Blood pressure 112/68, pulse 68, resp. rate 16, weight 156 lb (70.8 kg).  General: Alert, good recall of personal medical history    Respiratory: Non labored breathing on room air    Cardiovascular: Regular rate    Mental status: Alert, oriented, language fluent, comprehension intact    Cranial nerves: Optic disc margins sharp VF full to confrontation bilaterally. Pupils equal, round, equally reactive to light and accommodation. Extraocular eye movements intact without nystagmus. Face sensation intact to light touch. Face strength symmetric and intact. No dysarthria.    Motor:   Power:   -Right and left upper extremity: shoulder abductors 5, wrist extensors 5, finger extension 5  -Right and left lower extremity: hip flexion 5, knee extension 5, dorsiflexion 5  Tone: Normal   Bulk: Normal  Abnormal movements: None    Sensory: Intact to light touch in distal extremities.    Coordination: Finger to nose and heel to shin intact.    Reflexes: Right/Left: Biceps 2/2, brachioradialis 2/2, hoffmans negative. Patella 2/2,  achilles 2/2.    Gait: Narrow based, symmetric arm swing, no gait assist.      Labs:   None    Imaging:  None    Assessment:  Al Noonan is a(n) 54 year old female with a medical history of lymphocytic colitis, hysterectomy, hearing impairment (left side, unsure why, had CT) who presents for headache. New onset headache 6/2024, no clear impetus, associated nausea, photophobia, chills, smell sensitivity. Having daily headaches, ~16 severe days monthly. Neurologic exam unrevealing. Will image for secondary etiology. Headaches with some features chronic daily migraine without aura, will trial Ubrelvy. Of note, sumatriptan did not palliate well, also would like to avoid triptans as on SSRI.     Plan:  -Headache management:    -Abortive: Ubrelvy   -Preventive: Trial riboflavin (vitamin B2) 400mg by mouth once daily and magnesium citrate 600mg by mouth once daily (magnesium may cause loose or dark stools). Lifestyle modifications that may help reduce your headaches include good sleep hygiene, regular meals, and avoiding foods that can precipitate a migraine headache (dark chocolate, red wine, etc.). May consider propranolol in future.   -MRI brain with and without, MRA head   -Blood work  -Keep a headache diary  -Aim for healthy high produce diet, aim for regular exercise      1. New onset headache  - Comp Metabolic Panel (14); Future  - MRI BRAIN (W+WO) (CPT=70553) [[id number]]; Future  - MRA BRAIN (CPT=70544) [[id number]]; Future    2. Nausea  - Comp Metabolic Panel (14); Future  - MRI BRAIN (W+WO) (CPT=70553) [[id number]]; Future  - MRA BRAIN (CPT=70544) [[id number]]; Future    3. Medication monitoring encounter  - Comp Metabolic Panel (14); Future    4. Intractable chronic migraine without aura and without status migrainosus  - ubrogepant (UBRELVY) 100 MG Oral Tab; Take one tablet at onset of migraine.  May take additional tablet in 2 hours if needed.  Do not exceed two tablets per 24 hour period.  Dispense: 30 tablet;  Refill: 3    Ceci Block, DO

## 2024-09-10 NOTE — PROGRESS NOTES
Patient states headaches started in June 2024. Patient states some body chills with headaches. Sensitivity to smells.

## 2024-09-10 NOTE — PATIENT INSTRUCTIONS
-Headache management:    -Abortive: Ubrelvy   -Preventive: Trial riboflavin (vitamin B2) 400mg by mouth once daily and magnesium citrate 600mg by mouth once daily (magnesium may cause loose or dark stools). Lifestyle modifications that may help reduce your headaches include good sleep hygiene, regular meals, and avoiding foods that can precipitate a migraine headache (dark chocolate, red wine, etc.). May consider propranolol in future.   -MRI brain with and without, MRA head   -Blood work  -Keep a headache diary  -Aim for healthy high produce diet, aim for regular exercise    Refill policies:    Allow 2-3 business days for refills; controlled substances may take longer.  Contact your pharmacy at least 5 days prior to running out of medication and have them send an electronic request or submit request through the “request refill” option in your [employer] account.  Refills are not addressed on weekends; covering physicians do not authorize routine medications on weekends.  No narcotics or controlled substances are refilled after noon on Fridays or by on call physicians.  By law, narcotics must be electronically prescribed.  A 30 day supply with no refills is the maximum allowed.  If your prescription is due for a refill, you may be due for a follow up appointment.  To best provide you care, patients receiving routine medications need to be seen at least once a year.  Patients receiving narcotic/controlled substance medications need to be seen at least once every 3 months.  In the event that your preferred pharmacy does not have the requested medication in stock (e.g. Backordered), it is your responsibility to find another pharmacy that has the requested medication available.  We will gladly send a new prescription to that pharmacy at your request.    Scheduling Tests:    If your physician has ordered radiology tests such as MRI or CT scans, please contact Central Scheduling at [phone] right away to schedule the  test.  Once scheduled, the [facility] Centralized Referral Team will work with your insurance carrier to obtain pre-certification or prior authorization.  Depending on your insurance carrier, approval may take 3-10 days.  It is highly recommended patients assure they have received an authorization before having a test performed.  If test is done without insurance authorization, patient may be responsible for the entire amount billed.      Precertification and Prior Authorizations:  If your physician has recommended that you have a procedure or additional testing performed the [facility] Centralized Referral Team will contact your insurance carrier to obtain pre-certification or prior authorization.    You are strongly encouraged to contact your insurance carrier to verify that your procedure/test has been approved and is a COVERED benefit.  Although the [facility] Centralized Referral Team does its due diligence, the insurance carrier gives the disclaimer that \"Although the procedure is authorized, this does not guarantee payment.\"    Ultimately the patient is responsible for payment.   Thank you for your understanding in this matter.  Paperwork Completion:  If you require FMLA or disability paperwork for your recovery, please make sure to either drop it off or have it faxed to our office at [phone]. Be sure the form has your name and date of birth on it.  The form will be faxed to our Forms Department and they will complete it for you.  There is a 25$ fee for all forms that need to be filled out.  Please be aware there is a 10-14 day turnaround time.  You will need to sign a release of information (TRACE) form if your paperwork does not come with one.  You may call the Forms Department with any questions at [phone].  Their fax number is [phone].

## 2024-09-16 ENCOUNTER — LAB ENCOUNTER (OUTPATIENT)
Dept: LAB | Facility: REFERENCE LAB | Age: 54
End: 2024-09-16
Attending: STUDENT IN AN ORGANIZED HEALTH CARE EDUCATION/TRAINING PROGRAM
Payer: COMMERCIAL

## 2024-09-16 DIAGNOSIS — R11.0 NAUSEA: ICD-10-CM

## 2024-09-16 DIAGNOSIS — Z51.81 MEDICATION MONITORING ENCOUNTER: ICD-10-CM

## 2024-09-16 DIAGNOSIS — R51.9 NEW ONSET HEADACHE: ICD-10-CM

## 2024-09-16 LAB
ALBUMIN SERPL-MCNC: 4.1 G/DL (ref 3.2–4.8)
ALBUMIN/GLOB SERPL: 1.6 {RATIO} (ref 1–2)
ALP LIVER SERPL-CCNC: 50 U/L
ALT SERPL-CCNC: 15 U/L
ANION GAP SERPL CALC-SCNC: 6 MMOL/L (ref 0–18)
AST SERPL-CCNC: 19 U/L (ref ?–34)
BILIRUB SERPL-MCNC: 0.4 MG/DL (ref 0.3–1.2)
BUN BLD-MCNC: 13 MG/DL (ref 9–23)
BUN/CREAT SERPL: 15.7 (ref 10–20)
CALCIUM BLD-MCNC: 9.3 MG/DL (ref 8.7–10.4)
CHLORIDE SERPL-SCNC: 110 MMOL/L (ref 98–112)
CO2 SERPL-SCNC: 27 MMOL/L (ref 21–32)
CREAT BLD-MCNC: 0.83 MG/DL
EGFRCR SERPLBLD CKD-EPI 2021: 83 ML/MIN/1.73M2 (ref 60–?)
FASTING STATUS PATIENT QL REPORTED: YES
GLOBULIN PLAS-MCNC: 2.5 G/DL (ref 2–3.5)
GLUCOSE BLD-MCNC: 104 MG/DL (ref 70–99)
OSMOLALITY SERPL CALC.SUM OF ELEC: 296 MOSM/KG (ref 275–295)
POTASSIUM SERPL-SCNC: 4 MMOL/L (ref 3.5–5.1)
PROT SERPL-MCNC: 6.6 G/DL (ref 5.7–8.2)
SODIUM SERPL-SCNC: 143 MMOL/L (ref 136–145)

## 2024-09-16 PROCEDURE — 80053 COMPREHEN METABOLIC PANEL: CPT | Performed by: STUDENT IN AN ORGANIZED HEALTH CARE EDUCATION/TRAINING PROGRAM

## 2024-09-17 ENCOUNTER — TELEPHONE (OUTPATIENT)
Dept: NEUROLOGY | Facility: CLINIC | Age: 54
End: 2024-09-17

## 2024-09-17 DIAGNOSIS — G43.719 INTRACTABLE CHRONIC MIGRAINE WITHOUT AURA AND WITHOUT STATUS MIGRAINOSUS: ICD-10-CM

## 2024-09-17 RX ORDER — UBROGEPANT 100 MG/1
TABLET ORAL
Qty: 10 TABLET | Refills: 5 | Status: SHIPPED | OUTPATIENT
Start: 2024-09-17 | End: 2025-09-17

## 2024-09-17 NOTE — TELEPHONE ENCOUNTER
[id number] - [place], [place] - [address] [place] [address] AT ACROSS FROM [address], [phone], [phone]

## 2024-09-19 NOTE — TELEPHONE ENCOUNTER
Prior authorization initiated through [employer]     Authorized from August 20, 2024 to September 19, 2025     [employer] message sent to notify patient.

## 2024-10-02 ENCOUNTER — OFFICE VISIT (OUTPATIENT)
Dept: FAMILY MEDICINE CLINIC | Facility: CLINIC | Age: 54
End: 2024-10-02
Payer: COMMERCIAL

## 2024-10-02 VITALS
BODY MASS INDEX: 28.71 KG/M2 | HEART RATE: 71 BPM | SYSTOLIC BLOOD PRESSURE: 112 MMHG | HEIGHT: 62 IN | WEIGHT: 156 LBS | DIASTOLIC BLOOD PRESSURE: 74 MMHG

## 2024-10-02 DIAGNOSIS — M54.2 NECK PAIN: ICD-10-CM

## 2024-10-02 DIAGNOSIS — Z00.00 ROUTINE MEDICAL EXAM: Primary | ICD-10-CM

## 2024-10-02 DIAGNOSIS — F41.9 ANXIETY: ICD-10-CM

## 2024-10-02 PROCEDURE — 3008F BODY MASS INDEX DOCD: CPT | Performed by: FAMILY MEDICINE

## 2024-10-02 PROCEDURE — 3078F DIAST BP <80 MM HG: CPT | Performed by: FAMILY MEDICINE

## 2024-10-02 PROCEDURE — 3074F SYST BP LT 130 MM HG: CPT | Performed by: FAMILY MEDICINE

## 2024-10-02 PROCEDURE — 99396 PREV VISIT EST AGE 40-64: CPT | Performed by: FAMILY MEDICINE

## 2024-10-02 RX ORDER — ESCITALOPRAM OXALATE 20 MG/1
20 TABLET ORAL DAILY
Qty: 90 TABLET | Refills: 4 | Status: SHIPPED | OUTPATIENT
Start: 2024-10-02

## 2024-10-02 RX ORDER — CYCLOBENZAPRINE HCL 5 MG
5 TABLET ORAL NIGHTLY
Qty: 30 TABLET | Refills: 0 | Status: SHIPPED | OUTPATIENT
Start: 2024-10-02

## 2024-10-02 NOTE — PROGRESS NOTES
HPI:   Al Noonan is a 54 year old female who presents for a complete physical exam.    Started Zepbound in January - 7.5 mg. Doing it through weight watchers. Insurance will cover it if goes up in dose. Walking for exercise - doing some weights.   Having some neck pain - woke up with stiff neck few weeks ago and not helping.   Wt Readings from Last 3 Encounters:   10/02/24 156 lb (70.8 kg)   09/10/24 156 lb (70.8 kg)   23 184 lb 6.4 oz (83.6 kg)     Body mass index is 28.53 kg/m².       Current Outpatient Medications   Medication Sig Dispense Refill    ubrogepant (UBRELVY) 100 MG Oral Tab Take one tablet at onset of migraine.  May take additional tablet in 2 hours if needed.  Do not exceed two tablets per 24 hour period. 10 tablet 5    ZEPBOUND 7.5 MG/0.5ML Subcutaneous Solution Auto-injector Inject 7.5 mg into the skin once a week.      ubrogepant (UBRELVY) 100 MG Oral Tab Take one tablet at onset of migraine.  May take additional tablet in 2 hours if needed.  Do not exceed two tablets per 24 hour period. 3 tablet 0    Estradiol 10 MCG Vaginal Tab PLACE 1 TABLET VAGINALLY TWICE A WEEK ON MONDAY AND THURSDAY      escitalopram 20 MG Oral Tab Take 1 tablet (20 mg total) by mouth daily. 90 tablet 4    clonazePAM (KLONOPIN OR) Take by mouth as needed.      traZODone 100 MG Oral Tab Take 1 tablet (100 mg total) by mouth as needed.        Past Medical History:    Allergic rhinitis    Anal fissure    Anxiety    Anxiety state    Colitis    lymphocytic colitis    Hearing impairment    Irritable bowel syndrome    Obesity    Osteoarthritis    PONV (postoperative nausea and vomiting)    Visual impairment      Past Surgical History:   Procedure Laterality Date    Colonoscopy  2013    Lymphocytic colitis    Colonoscopy      Colonoscopy N/A 2022    Procedure: COLONOSCOPY;  Surgeon: Elio Mott MD;  Location: [facility] ENDOSCOPY    Hysterectomy            Total abdom hysterectomy  2014      Family  History   Problem Relation Age of Onset    Cancer Mother         Lung Cancer    Cancer Father         Bladder cancer    Cancer Maternal Grandfather         Colon cancer    Cancer Paternal Grandmother         Breast cancer      Social History:   Social History     Socioeconomic History    Marital status:    Tobacco Use    Smoking status: Former     Current packs/day: 0.00     Types: Cigarettes     Start date: 10/1/2016     Quit date: 10/1/2016     Years since quittin.0    Smokeless tobacco: Never   Vaping Use    Vaping status: Every Day   Substance and Sexual Activity    Alcohol use: Yes     Alcohol/week: 2.0 standard drinks of alcohol     Types: 2 Standard drinks or equivalent per week     Comment: occ    Drug use: Never   Other Topics Concern    Caffeine Concern Yes     Comment: coffee 2 cups    Exercise Yes     Comment: walking          REVIEW OF SYSTEMS:   GENERAL: feels well otherwise  Review of Systems   EXAM:   /74   Pulse 71   Ht 5' 2\" (1.575 m)   Wt 156 lb (70.8 kg)   BMI 28.53 kg/m²     GENERAL: well developed, well nourished,in no apparent distress  SKIN: no rashes,no suspicious lesions  HEENT: atraumatic, normocephalic,ears and throat are clear  EYES:PERRLA, EOMI, conjunctiva are clear  NECK: tightness in left superior traps   LUNGS: clear to auscultation  CARDIO: RRR without murmur  GI: good BS's,no masses, HSM or tenderness  EXTREMITIES: no cyanosis, clubbing or edema  NEURO: Oriented times three,cranial nerves are intact,motor and sensory are grossly intact    ASSESSMENT AND PLAN:   Al Noonan is a 54 year old female who presents for a complete physical exam.    1. Routine medical exam    - CBC With Differential With Platelet; Future  - Comp Metabolic Panel (14); Future  - Lipid Panel; Future  - TSH W Reflex To Free T4; Future  - Vitamin B12; Future  - Vitamin D; Future    2. Neck pain  Flexeril, foam roller exercises - vertical   - Physical Therapy Referral - [place]  Locations    3. Anxiety         Tesha Enriquez MD  10/2/2024  3:02 PM

## 2024-10-11 ENCOUNTER — LAB ENCOUNTER (OUTPATIENT)
Dept: LAB | Age: 54
End: 2024-10-11
Attending: STUDENT IN AN ORGANIZED HEALTH CARE EDUCATION/TRAINING PROGRAM
Payer: COMMERCIAL

## 2024-10-11 ENCOUNTER — HOSPITAL ENCOUNTER (OUTPATIENT)
Dept: MRI IMAGING | Age: 54
Discharge: HOME | End: 2024-10-11
Attending: STUDENT IN AN ORGANIZED HEALTH CARE EDUCATION/TRAINING PROGRAM
Payer: COMMERCIAL

## 2024-10-11 DIAGNOSIS — R11.0 NAUSEA: ICD-10-CM

## 2024-10-11 DIAGNOSIS — R51.9 NEW ONSET HEADACHE: ICD-10-CM

## 2024-10-11 DIAGNOSIS — Z00.00 ROUTINE MEDICAL EXAM: ICD-10-CM

## 2024-10-11 LAB
ALBUMIN SERPL-MCNC: 4.7 G/DL (ref 3.2–4.8)
ALBUMIN/GLOB SERPL: 1.9 {RATIO} (ref 1–2)
ALP LIVER SERPL-CCNC: 54 U/L
ALT SERPL-CCNC: 15 U/L
ANION GAP SERPL CALC-SCNC: 6 MMOL/L (ref 0–18)
AST SERPL-CCNC: 20 U/L (ref ?–34)
BASOPHILS # BLD AUTO: 0.01 X10(3) UL (ref 0–0.2)
BASOPHILS NFR BLD AUTO: 0.3 %
BILIRUB SERPL-MCNC: 0.5 MG/DL (ref 0.3–1.2)
BUN BLD-MCNC: 13 MG/DL (ref 9–23)
BUN/CREAT SERPL: 15.3 (ref 10–20)
CALCIUM BLD-MCNC: 9.4 MG/DL (ref 8.7–10.4)
CHLORIDE SERPL-SCNC: 108 MMOL/L (ref 98–112)
CHOLEST SERPL-MCNC: 188 MG/DL (ref ?–200)
CO2 SERPL-SCNC: 27 MMOL/L (ref 21–32)
CREAT BLD-MCNC: 0.85 MG/DL
DEPRECATED RDW RBC AUTO: 43.4 FL (ref 35.1–46.3)
EGFRCR SERPLBLD CKD-EPI 2021: 81 ML/MIN/1.73M2 (ref 60–?)
EOSINOPHIL # BLD AUTO: 0.02 X10(3) UL (ref 0–0.7)
EOSINOPHIL NFR BLD AUTO: 0.5 %
ERYTHROCYTE [DISTWIDTH] IN BLOOD BY AUTOMATED COUNT: 13.2 % (ref 11–15)
EST. AVERAGE GLUCOSE BLD GHB EST-MCNC: 100 MG/DL (ref 68–126)
FASTING PATIENT LIPID ANSWER: YES
FASTING STATUS PATIENT QL REPORTED: YES
GLOBULIN PLAS-MCNC: 2.5 G/DL (ref 2–3.5)
GLUCOSE BLD-MCNC: 93 MG/DL (ref 70–99)
HBA1C MFR BLD: 5.1 % (ref ?–5.7)
HCT VFR BLD AUTO: 41.4 %
HDLC SERPL-MCNC: 55 MG/DL (ref 40–59)
HGB BLD-MCNC: 13.6 G/DL
IMM GRANULOCYTES # BLD AUTO: 0.01 X10(3) UL (ref 0–1)
IMM GRANULOCYTES NFR BLD: 0.3 %
LDLC SERPL CALC-MCNC: 122 MG/DL (ref ?–100)
LYMPHOCYTES # BLD AUTO: 0.68 X10(3) UL (ref 1–4)
LYMPHOCYTES NFR BLD AUTO: 17 %
MCH RBC QN AUTO: 29.7 PG (ref 26–34)
MCHC RBC AUTO-ENTMCNC: 32.9 G/DL (ref 31–37)
MCV RBC AUTO: 90.4 FL
MONOCYTES # BLD AUTO: 0.26 X10(3) UL (ref 0.1–1)
MONOCYTES NFR BLD AUTO: 6.5 %
NEUTROPHILS # BLD AUTO: 3.02 X10 (3) UL (ref 1.5–7.7)
NEUTROPHILS # BLD AUTO: 3.02 X10(3) UL (ref 1.5–7.7)
NEUTROPHILS NFR BLD AUTO: 75.4 %
NONHDLC SERPL-MCNC: 133 MG/DL (ref ?–130)
OSMOLALITY SERPL CALC.SUM OF ELEC: 292 MOSM/KG (ref 275–295)
PLATELET # BLD AUTO: 217 10(3)UL (ref 150–450)
POTASSIUM SERPL-SCNC: 4.4 MMOL/L (ref 3.5–5.1)
PROT SERPL-MCNC: 7.2 G/DL (ref 5.7–8.2)
RBC # BLD AUTO: 4.58 X10(6)UL
SODIUM SERPL-SCNC: 141 MMOL/L (ref 136–145)
TRIGL SERPL-MCNC: 59 MG/DL (ref 30–149)
TSI SER-ACNC: 0.95 MIU/ML (ref 0.55–4.78)
VIT B12 SERPL-MCNC: 495 PG/ML (ref 211–911)
VIT D+METAB SERPL-MCNC: 40.4 NG/ML (ref 30–100)
VLDLC SERPL CALC-MCNC: 10 MG/DL (ref 0–30)
WBC # BLD AUTO: 4 X10(3) UL (ref 4–11)

## 2024-10-11 PROCEDURE — 85025 COMPLETE CBC W/AUTO DIFF WBC: CPT

## 2024-10-11 PROCEDURE — 82306 VITAMIN D 25 HYDROXY: CPT

## 2024-10-11 PROCEDURE — 83036 HEMOGLOBIN GLYCOSYLATED A1C: CPT

## 2024-10-11 PROCEDURE — 82607 VITAMIN B-12: CPT

## 2024-10-11 PROCEDURE — 36415 COLL VENOUS BLD VENIPUNCTURE: CPT

## 2024-10-11 PROCEDURE — 70544 MR ANGIOGRAPHY HEAD W/O DYE: CPT | Performed by: STUDENT IN AN ORGANIZED HEALTH CARE EDUCATION/TRAINING PROGRAM

## 2024-10-11 PROCEDURE — A9575 INJ GADOTERATE MEGLUMI 0.1ML: HCPCS | Performed by: STUDENT IN AN ORGANIZED HEALTH CARE EDUCATION/TRAINING PROGRAM

## 2024-10-11 PROCEDURE — 84443 ASSAY THYROID STIM HORMONE: CPT

## 2024-10-11 PROCEDURE — 70553 MRI BRAIN STEM W/O & W/DYE: CPT | Performed by: STUDENT IN AN ORGANIZED HEALTH CARE EDUCATION/TRAINING PROGRAM

## 2024-10-11 PROCEDURE — 80061 LIPID PANEL: CPT

## 2024-10-11 PROCEDURE — 80053 COMPREHEN METABOLIC PANEL: CPT

## 2024-10-11 RX ORDER — GADOTERATE MEGLUMINE 376.9 MG/ML
15 INJECTION INTRAVENOUS
Status: COMPLETED | OUTPATIENT
Start: 2024-10-11 | End: 2024-10-11

## 2024-10-11 RX ADMIN — GADOTERATE MEGLUMINE 14 ML: 376.9 INJECTION INTRAVENOUS at 08:27:00

## 2024-10-16 NOTE — PROGRESS NOTES
Overall labs look very good. Just LDL cholesterol was a bit elevated. Continue with healthy eating and regular exercise. - Dr. Enriquez

## 2024-10-25 ENCOUNTER — TELEPHONE (OUTPATIENT)
Dept: PHYSICAL THERAPY | Facility: HOSPITAL | Age: 54
End: 2024-10-25

## 2024-10-28 ENCOUNTER — OFFICE VISIT (OUTPATIENT)
Dept: PHYSICAL THERAPY | Age: 54
End: 2024-10-28
Attending: FAMILY MEDICINE
Payer: COMMERCIAL

## 2024-10-28 DIAGNOSIS — M54.2 NECK PAIN: Primary | ICD-10-CM

## 2024-10-28 PROCEDURE — 97530 THERAPEUTIC ACTIVITIES: CPT

## 2024-10-28 PROCEDURE — 97110 THERAPEUTIC EXERCISES: CPT

## 2024-10-28 PROCEDURE — 97140 MANUAL THERAPY 1/> REGIONS: CPT

## 2024-10-28 NOTE — PROGRESS NOTES
SPINE EVALUATION:     Diagnosis:   Neck Pain      Referring Provider: Tesha Enriquez  Date of Evaluation:    10/28/2024    Precautions:  None Next MD visit:   none scheduled  Date of Surgery: n/a     PATIENT SUMMARY   Al Noonan is a 54 year old female who presents to therapy today with complaints of of left neck pain for the past 6 weeks for no specific reason.  Pt describes pain level current 3/10, at best 3/10, at worst 5/10.   Current functional limitations include crom.     Al describes prior level of function was independent with her ADLs and IADLs. Pt goals include decreasing her pain.  Past medical history was reviewed with Al. No significant findings at this time.  Pt denies diplopia, dysarthria, dysphasia, dizziness, drop attacks, bowel/bladder changes, saddle anesthesia, and BENITEZ LE N/T.    ASSESSMENT  Al presents to physical therapy evaluation with primary c/o neck pain. The results of the objective tests and measures show decreased crom, tightness and poor posture.  Functional deficits include but are not limited to crom.  Signs and symptoms are consistent with diagnosis of neck pain. Pt and PT discussed evaluation findings, pathology, POC and HEP.  Pt voiced understanding and performs HEP correctly without reported pain. Skilled Physical Therapy is medically necessary to address the above impairments and reach functional goals.     OBJECTIVE:   Observation/Posture: Forward head and benitez. Rounded shoulders.  Neuro Screen: Intact benitez.    Cervical AROM: (* denotes performed with pain)  Flexion: 75%  Extension: 50%  Sidebending: R 50%; L 50%  Rotation: R 50%; L 50%    Accessory motion: Cervical hypomobility with central and uni. PAs; +2 grades  Palpation: Pain at the left cervical region.    Strength: (* denotes performed with pain)  UE/Scapular   Shoulder Flex: R 4+/5, L 4+/5  Shoulder ABD (C5): R 4+/5, L 4+/5  Biceps (C6): R 4+/5, L 4+/5  Wrist ext (C6): R 4+/5, L 4+/5  Triceps (C7): R  4+/5, L 4+/5  Wrist Flex (C7): R 4+/5, L 4+/5  Digit Flex (C8): R 4+/5, L 4+/5  Thumb Ext (C8): R 4+/5, L 4+/5  Interossei (T1): R 4+/5, L 4+/5    Rhomboids: R 4/5, L 4/5  Mid trap: R 4/5; L 4/5  Lats: R 4/5, L 4/5  Low trap: R 4/5; L 4/5     Flexibility:   UE/Scapular   Upper Trap: R Tight; L Tighter  Levator Scap: R Tight; L Tighter  Pec Major: R Tight; L Tighter  Scalenes: R Tight, L Tighter     Special tests:   Negative for compression, distraction and vertebral artery tests.    Today’s Treatment and Response:   Pt education was provided on exam findings, treatment diagnosis, treatment plan, expectations, and prognosis. Pt was also provided recommendations for activity modifications, postural corrections, and ergonomics  Patient was instructed in and issued a HEP for: Manual for 15'- STM at the left cervical region and dry needling.          Ther. Act. For 10'- Discussed her condition, pt expectations and prognosis.    Charges: 1PT Eval Low Complexity, 1TA and 1MT      Total Timed Treatment: 25 min     Total Treatment Time: 45 min     Based on clinical rationale and outcome measures, this evaluation involved Low Complexity decision making.  PLAN OF CARE:    Goals: (to be met in 12 visits)   .Pt will improve cervical AROM flexion by chin to chest to improve tolerance for looking down to tie shoes   Pt will improve cervical AROM extension by looking at the ceiling to improve tolerance for putting dishes into overhead cabinets   Pt will improve cervical AROM rotation to >45 degrees to improve tolerance for turning head to check blind spot while driving  Pt will have improved thoracic PA mobility to WNL to improve cervical ROM as well as promote upright posturing and decreased pain with crom   Pt will report decreased frequency of headaches to <1x/week  Pt will demonstrate improved cervical intrinsic strength to 5/5 to allow improved cervical stabilization with overhead reaching (12 visits)  Pt will improve postural  strength (mid/low trap) to 5/5 to promote improved upright posturing and decreased pain with crom   Pt will be independent and compliant with comprehensive HEP to maintain progress achieved in PT      Frequency / Duration: Patient will be seen for 2 x/week or a total of 12 visits over a 90 day period. Treatment will include: Manual Therapy, Mechanical Traction, Neuromuscular Re-education, Therapeutic Activities, Therapeutic Exercise, Home Exercise Program instruction, and Modalities to include: Electrical stimulation (unattended)    Education or treatment limitation: None  Rehab Potential:fair    Neck Disability Index Score  Score: (Patient-Rptd) 32 % (10/22/2024  8:29 PM)      Patient/Family/Caregiver was advised of these findings, precautions, and treatment options and has agreed to actively participate in planning and for this course of care.    Thank you for your referral. Please co-sign or sign and return this letter via fax as soon as possible to [phone]. If you have any questions, please contact me at Dept: [phone]    Sincerely,  Electronically signed by therapist: Caesar Wade, PT, DPT, CSCS    Physician's certification required: Yes  I certify the need for these services furnished under this plan of treatment and while under my care.    X___________________________________________________ Date____________________    Certification From: 10/28/2024  To:1/26/2025

## 2024-11-01 ENCOUNTER — APPOINTMENT (OUTPATIENT)
Dept: PHYSICAL THERAPY | Age: 54
End: 2024-11-01
Attending: FAMILY MEDICINE
Payer: COMMERCIAL

## 2024-11-08 ENCOUNTER — OFFICE VISIT (OUTPATIENT)
Dept: PHYSICAL THERAPY | Age: 54
End: 2024-11-08
Attending: FAMILY MEDICINE
Payer: COMMERCIAL

## 2024-11-08 DIAGNOSIS — M54.2 NECK PAIN: Primary | ICD-10-CM

## 2024-11-08 PROCEDURE — 97140 MANUAL THERAPY 1/> REGIONS: CPT

## 2024-11-08 PROCEDURE — 97112 NEUROMUSCULAR REEDUCATION: CPT

## 2024-11-08 PROCEDURE — 97110 THERAPEUTIC EXERCISES: CPT

## 2024-11-08 NOTE — PROGRESS NOTES
Diagnosis:   Neck Pain      Referring Provider: Tesha Enriquez  Date of Evaluation:    10/28/24    Precautions:  None Next MD visit:   none scheduled  Date of Surgery: n/a   Insurance Primary/Secondary: BCBS OUT OF STATE / N/A     # Auth Visits: 12            Subjective: Patient reports cervical pain this afternoon. She has not started her HEP yet. She felt ok after her initial eval.    Pain: 3/10      Objective: Observation/Posture: Forward head and giovanna. Rounded shoulders.  Neuro Screen: Intact giovanna.     Cervical AROM: (* denotes performed with pain)  Flexion: 75%  Extension: 50%  Sidebending: R 50%; L 50%  Rotation: R 50%; L 50%     Accessory motion: Cervical hypomobility with central and uni. PAs; +2 grades  Palpation: Pain at the left cervical region.     Strength: (* denotes performed with pain)  UE/Scapular   Shoulder Flex: R 4+/5, L 4+/5  Shoulder ABD (C5): R 4+/5, L 4+/5  Biceps (C6): R 4+/5, L 4+/5  Wrist ext (C6): R 4+/5, L 4+/5  Triceps (C7): R 4+/5, L 4+/5  Wrist Flex (C7): R 4+/5, L 4+/5  Digit Flex (C8): R 4+/5, L 4+/5  Thumb Ext (C8): R 4+/5, L 4+/5  Interossei (T1): R 4+/5, L 4+/5     Rhomboids: R 4/5, L 4/5  Mid trap: R 4/5; L 4/5  Lats: R 4/5, L 4/5  Low trap: R 4/5; L 4/5      Flexibility:   UE/Scapular   Upper Trap: R Tight; L Tighter  Levator Scap: R Tight; L Tighter  Pec Major: R Tight; L Tighter  Scalenes: R Tight, L Tighter      Special tests:   Negative for compression, distraction and vertebral artery tests.      Assessment: Patient presents with left manual cervical tightness and guarding during her manual stretching and stm. The patient presents with left cervical  pain and tenderness during her manual therapy.      Goals: (to be met in 12 visits)   .Pt will improve cervical AROM flexion by chin to chest to improve tolerance for looking down to tie shoes   Pt will improve cervical AROM extension by looking at the ceiling to improve tolerance for putting dishes into overhead cabinets   Pt  will improve cervical AROM rotation to >45 degrees to improve tolerance for turning head to check blind spot while driving  Pt will have improved thoracic PA mobility to WNL to improve cervical ROM as well as promote upright posturing and decreased pain with crom   Pt will report decreased frequency of headaches to <1x/week  Pt will demonstrate improved cervical intrinsic strength to 5/5 to allow improved cervical stabilization with overhead reaching (12 visits)  Pt will improve postural strength (mid/low trap) to 5/5 to promote improved upright posturing and decreased pain with crom   Pt will be independent and compliant with comprehensive HEP to maintain progress achieved in PT      Plan: Plan to work on crom, stretching and strengthening.  Date: 11/8/2024  TX#: 2/12 Date:                 TX#: 3/ Date:                 TX#: 4/ Date:                 TX#: 5/ Date:   Tx#: 6/   Ther. Ex.: 20'  UBE 4'/4' L1  Cane Self Mob. 3'  Left Trap., lev. And scalene 2x30\"ea.  Doorway St. 4x30\"  Foam Roll Supine St. 3'       Manual: 8'  STM at left cervical region.       Neuro Re-ed.: 15'  Scap. Ret. 20x3\"  Shoulder Rolls 2x20  Foam Roll Flex. And [place] 20xea.       Modalities:  Ice at the left cervical region 10' unbilled after her treatment.       HEP: Reviewed her new hep.    Charges: 1TE, 1MT and 1Neuro       Total Timed Treatment: 45 min  Total Treatment Time: 45 min

## 2024-11-12 ENCOUNTER — APPOINTMENT (OUTPATIENT)
Dept: PHYSICAL THERAPY | Age: 54
End: 2024-11-12
Attending: FAMILY MEDICINE
Payer: COMMERCIAL

## 2024-11-14 ENCOUNTER — OFFICE VISIT (OUTPATIENT)
Dept: PHYSICAL THERAPY | Age: 54
End: 2024-11-14
Attending: FAMILY MEDICINE
Payer: COMMERCIAL

## 2024-11-14 DIAGNOSIS — M54.2 NECK PAIN: Primary | ICD-10-CM

## 2024-11-14 PROCEDURE — 97140 MANUAL THERAPY 1/> REGIONS: CPT

## 2024-11-14 PROCEDURE — 97112 NEUROMUSCULAR REEDUCATION: CPT

## 2024-11-14 PROCEDURE — 97530 THERAPEUTIC ACTIVITIES: CPT

## 2024-11-14 NOTE — PROGRESS NOTES
Diagnosis:   Neck Pain      Referring Provider: Tesha Enriquez  Date of Evaluation:    10/28/24    Precautions:  None Next MD visit:   none scheduled  Date of Surgery: n/a   Insurance Primary/Secondary: BCBS OUT OF STATE / N/A     # Auth Visits: 12            Subjective: Patient reports continued cervical pain this afternoon. She has not started her HEP yet. She felt ok after her last visit.    Pain: 2/10      Objective: Observation/Posture: Forward head and giovanna. Rounded shoulders.  Neuro Screen: Intact giovanna.     Cervical AROM: (* denotes performed with pain)  Flexion: 75%  Extension: 50%  Sidebending: R 50%; L 50%  Rotation: R 50%; L 50%     Accessory motion: Cervical hypomobility with central and uni. PAs; +2 grades  Palpation: Pain at the left cervical region.     Strength: (* denotes performed with pain)  UE/Scapular   Shoulder Flex: R 4+/5, L 4+/5  Shoulder ABD (C5): R 4+/5, L 4+/5  Biceps (C6): R 4+/5, L 4+/5  Wrist ext (C6): R 4+/5, L 4+/5  Triceps (C7): R 4+/5, L 4+/5  Wrist Flex (C7): R 4+/5, L 4+/5  Digit Flex (C8): R 4+/5, L 4+/5  Thumb Ext (C8): R 4+/5, L 4+/5  Interossei (T1): R 4+/5, L 4+/5     Rhomboids: R 4/5, L 4/5  Mid trap: R 4/5; L 4/5  Lats: R 4/5, L 4/5  Low trap: R 4/5; L 4/5      Flexibility:   UE/Scapular   Upper Trap: R Tight; L Tighter  Levator Scap: R Tight; L Tighter  Pec Major: R Tight; L Tighter  Scalenes: R Tight, L Tighter      Special tests:   Negative for compression, distraction and vertebral artery tests.      Assessment: Patient presents with continued left cervical tightness and guarding during her manual stretching and stm. The patient presents with scapular weakness and instability during her strengthening and posture re-ed.      Goals: (to be met in 12 visits)   .Pt will improve cervical AROM flexion by chin to chest to improve tolerance for looking down to tie shoes   Pt will improve cervical AROM extension by looking at the ceiling to improve tolerance for putting dishes  into overhead cabinets   Pt will improve cervical AROM rotation to >45 degrees to improve tolerance for turning head to check blind spot while driving  Pt will have improved thoracic PA mobility to WNL to improve cervical ROM as well as promote upright posturing and decreased pain with crom   Pt will report decreased frequency of headaches to <1x/week  Pt will demonstrate improved cervical intrinsic strength to 5/5 to allow improved cervical stabilization with overhead reaching (12 visits)  Pt will improve postural strength (mid/low trap) to 5/5 to promote improved upright posturing and decreased pain with crom   Pt will be independent and compliant with comprehensive HEP to maintain progress achieved in PT      Plan: Plan to work on crom, stretching and strengthening.  Date: 11/8/2024  TX#: 2/12 Date: 11/14/24                TX#: 3/12 Date:                 TX#: 4/ Date:                 TX#: 5/ Date:   Tx#: 6/   Ther. Ex.: 20'  UBE 4'/4' L1  Cane Self Mob. 3'  Left Trap., lev. And scalene 2x30\"ea.  Doorway St. 4x30\"  Foam Roll Supine St. 3' Ther. Ex.: 20'  UBE 4'/4' L1  Cane Self Mob. 3'  Left Trap., lev. And scalene 2x30\"ea.  Doorway St. 4x30\"  Foam Roll Supine St. 3'      Manual: 8'  STM at left cervical region. Manual: 8'  STM at left cervical region.      Neuro Re-ed.: 15'  Scap. Ret. 20x3\"  Shoulder Rolls 2x20  Foam Roll Flex. And [place] 20xea. Neuro Re-ed.: 15'  GTB Scap. Ret. 2x15  2# Shoulder Rolls 2x20  Foam Roll Flex. And [place] 20xea.      Modalities:  Ice at the left cervical region 10' unbilled after her treatment. Modalities:  MHP at the left cervical region 10' unbilled after her treatment.      HEP: Did not add any new exercises to her hep.    Charges: 1TE, 1MT and 1Neuro       Total Timed Treatment: 45 min  Total Treatment Time: 45 min

## 2024-12-12 PROBLEM — H91.90 HEARING LOSS: Status: ACTIVE | Noted: 2018-01-01

## 2024-12-12 PROBLEM — R92.30 DENSE BREASTS: Status: ACTIVE | Noted: 2017-07-12

## 2024-12-12 PROBLEM — E55.9 VITAMIN D DEFICIENCY: Status: ACTIVE | Noted: 2019-03-12

## 2024-12-12 PROBLEM — M19.90 ARTHRITIS: Status: ACTIVE | Noted: 2018-01-15

## 2024-12-19 NOTE — PROGRESS NOTES
Subjective:   Al Noonan is a 54 year old female who presents for Neck Pain (still has left sided neck pain, chronic, physical therapy makes it worse, takes Motrin for pain )   Patient is a pleasant 54-year-old male past medical history significant for vitamin D deficiency, anxiety, arthritis, migraines and colitis.  Patient presents to office today new to this provider for evaluation of continued left-sided neck pain.  Patient was seen in office by MD Enriquez on 10/2/2024 for his reported left-sided neck pain.  Woke up with stiff back.  Was provided with referral to physical therapy, cyclobenzaprine 5 mg nightly, and instructed to use foam roller.  Patient has completed 3 sessions physical therapy.  Patient states she has had left sided neck pain for months now. Cyclobenzaprine made her too sleepy. She has done PT and it made it worse. She woke up with stiff neck. No trauma, no injury. No long term steroids. No IVDU. Has decreased rom and trouble turning to left. She has sharp pains when she turns to the left.  No numbness, no tingling, no weakness.  Just have pain and decreased range of motion with every movement direction.  She does report does respond to ibuprofen.  We discussed using anti-inflammatories to alleviate pain.  We will trial a Medrol Dosepak, diclofenac twice daily as needed, and change cyclobenzaprine to tizanidine 2 mg 3 times daily as needed.  We will also obtain baseline imaging and refer to physiatry.  If MRI is needed x-ray will have been completed.        Past Medical History:    Allergic rhinitis    Anal fissure    Anxiety    Anxiety state    Colitis    lymphocytic colitis    Hearing impairment    Irritable bowel syndrome    Obesity    Osteoarthritis    PONV (postoperative nausea and vomiting)    Visual impairment      Past Surgical History:   Procedure Laterality Date    Colonoscopy  2013    Lymphocytic colitis    Colonoscopy      Colonoscopy N/A 09/13/2022    Procedure: COLONOSCOPY;   Surgeon: Elio Mott MD;  Location: [facility] ENDOSCOPY    Hysterectomy            Total abdom hysterectomy          History/Other:    Chief Complaint Reviewed and Verified  Nursing Notes Reviewed and   Verified  Tobacco Reviewed  Allergies Reviewed  Medications Reviewed    Problem List Reviewed  Medical History Reviewed  Surgical History   Reviewed  OB Status Reviewed  Family History Reviewed  Social History   Reviewed         Tobacco:  Social History     Tobacco Use   Smoking Status Former    Current packs/day: 0.00    Types: Cigarettes    Start date: 10/1/2016    Quit date: 10/1/2016    Years since quittin.2   Smokeless Tobacco Never     E-Cigarettes/Vaping       Questions Responses    E-Cigarette Use User - Current Status Unknown           Tobacco cessation counseling for <3 minutes.  She smoked tobacco in the past but quit greater than 12 months ago.  Social History     Tobacco Use   Smoking Status Former    Current packs/day: 0.00    Types: Cigarettes    Start date: 10/1/2016    Quit date: 10/1/2016    Years since quittin.2   Smokeless Tobacco Never          Current Outpatient Medications   Medication Sig Dispense Refill    tiZANidine HCl 2 MG Oral Cap Take 1 capsule (2 mg total) by mouth 3 (three) times daily as needed for Muscle spasms (neck stiffness). 42 capsule 0    methylPREDNISolone (MEDROL) 4 MG Oral Tablet Therapy Pack As directed. 21 each 0    diclofenac 50 MG Oral Tab EC Take 1 tablet (50 mg total) by mouth 2 (two) times daily as needed. 60 tablet 0    escitalopram 20 MG Oral Tab Take 1 tablet (20 mg total) by mouth daily. 90 tablet 4    ubrogepant (UBRELVY) 100 MG Oral Tab Take one tablet at onset of migraine.  May take additional tablet in 2 hours if needed.  Do not exceed two tablets per 24 hour period. 10 tablet 5    ZEPBOUND 7.5 MG/0.5ML Subcutaneous Solution Auto-injector Inject 7.5 mg into the skin once a week.      ubrogepant (UBRELVY) 100 MG Oral Tab  Take one tablet at onset of migraine.  May take additional tablet in 2 hours if needed.  Do not exceed two tablets per 24 hour period. 3 tablet 0    Estradiol 10 MCG Vaginal Tab PLACE 1 TABLET VAGINALLY TWICE A WEEK ON MONDAY AND THURSDAY      clonazePAM (KLONOPIN OR) Take by mouth as needed.      traZODone 100 MG Oral Tab Take 1 tablet (100 mg total) by mouth as needed.      cyclobenzaprine 5 MG Oral Tab Take 1 tablet (5 mg total) by mouth nightly. (Patient not taking: Reported on 12/20/2024) 30 tablet 0         Review of Systems:  Review of Systems   Constitutional: Negative.  Negative for activity change, appetite change, chills and fever.   HENT: Negative.  Negative for congestion, postnasal drip, rhinorrhea, sore throat, tinnitus and voice change.    Eyes: Negative.    Respiratory: Negative.  Negative for apnea, cough, chest tightness and shortness of breath.    Cardiovascular:  Negative for chest pain and leg swelling.   Gastrointestinal: Negative.  Negative for abdominal pain, anal bleeding, blood in stool, constipation, diarrhea and vomiting.   Genitourinary: Negative.  Negative for difficulty urinating, flank pain and menstrual problem.   Musculoskeletal:  Positive for myalgias, neck pain and neck stiffness. Negative for joint swelling.   Skin: Negative.    Neurological: Negative.  Negative for dizziness and headaches.   Psychiatric/Behavioral: Negative.  Negative for agitation.          Objective:   /85 (BP Location: Left arm, Patient Position: Sitting, Cuff Size: adult)   Pulse 80   Ht 5' 2\" (1.575 m)   Wt 147 lb 3.2 oz (66.8 kg)   SpO2 97%   BMI 26.92 kg/m²  Estimated body mass index is 26.92 kg/m² as calculated from the following:    Height as of this encounter: 5' 2\" (1.575 m).    Weight as of this encounter: 147 lb 3.2 oz (66.8 kg).  Physical Exam  Vitals and nursing note reviewed.   Constitutional:       Appearance: Normal appearance. She is normal weight.   HENT:      Head: Normocephalic  and atraumatic.      Right Ear: External ear normal.      Left Ear: External ear normal.   Neck:      Comments: Tender left superior aspect of trapezius.  Cardiovascular:      Rate and Rhythm: Normal rate and regular rhythm.      Pulses: Normal pulses.      Heart sounds: Normal heart sounds.   Pulmonary:      Effort: Pulmonary effort is normal.      Breath sounds: Normal breath sounds.   Musculoskeletal:         General: No signs of injury.      Cervical back: Tenderness present.   Skin:     Capillary Refill: Capillary refill takes less than 2 seconds.   Neurological:      Mental Status: She is alert and oriented to person, place, and time.           Assessment & Plan:   1. Neck pain (Primary)  -     tiZANidine HCl; Take 1 capsule (2 mg total) by mouth 3 (three) times daily as needed for Muscle spasms (neck stiffness).  Dispense: 42 capsule; Refill: 0  -     methylPREDNISolone; As directed.  Dispense: 21 each; Refill: 0  -     XR CERVICAL SPINE (2-3 VIEWS) (CPT=72040); Future; Expected date: 12/20/2024  -     Diclofenac Sodium; Take 1 tablet (50 mg total) by mouth 2 (two) times daily as needed.  Dispense: 60 tablet; Refill: 0  -     Physiatry Referral - In Network  2. Neck strain, subsequent encounter  -     tiZANidine HCl; Take 1 capsule (2 mg total) by mouth 3 (three) times daily as needed for Muscle spasms (neck stiffness).  Dispense: 42 capsule; Refill: 0  -     methylPREDNISolone; As directed.  Dispense: 21 each; Refill: 0  -     XR CERVICAL SPINE (2-3 VIEWS) (CPT=72040); Future; Expected date: 12/20/2024  -     Diclofenac Sodium; Take 1 tablet (50 mg total) by mouth 2 (two) times daily as needed.  Dispense: 60 tablet; Refill: 0  -     Physiatry Referral - In Network    1. Neck pain  Failed conservative management with foam roller, warm compresses and cyclobenzaprine.   Start tizanidine 2 mg tid prn  Start diclofenac bid prn  Start medrol dose pack  Obtain baseline imaging  Referral to Physiatry  Red flags  reviewed  Follow up 1 month   Consider gabapentin   - tiZANidine HCl 2 MG Oral Cap; Take 1 capsule (2 mg total) by mouth 3 (three) times daily as needed for Muscle spasms (neck stiffness).  Dispense: 42 capsule; Refill: 0  - methylPREDNISolone (MEDROL) 4 MG Oral Tablet Therapy Pack; As directed.  Dispense: 21 each; Refill: 0  - XR CERVICAL SPINE (2-3 VIEWS) (CPT=72040); Future  - diclofenac 50 MG Oral Tab EC; Take 1 tablet (50 mg total) by mouth 2 (two) times daily as needed.  Dispense: 60 tablet; Refill: 0  - Physiatry Referral - In Network    2. Neck strain, subsequent encounter  Failed conservative management with foam roller, warm compresses and cyclobenzaprine.   Start tizanidine 2 mg tid prn  Start diclofenac bid prn  Start medrol dose pack  Obtain baseline imaging  Referral to Physiatry  Red flags reviewed  Follow up 1 month   Consider gabapentin   - tiZANidine HCl 2 MG Oral Cap; Take 1 capsule (2 mg total) by mouth 3 (three) times daily as needed for Muscle spasms (neck stiffness).  Dispense: 42 capsule; Refill: 0  - methylPREDNISolone (MEDROL) 4 MG Oral Tablet Therapy Pack; As directed.  Dispense: 21 each; Refill: 0  - XR CERVICAL SPINE (2-3 VIEWS) (CPT=72040); Future  - diclofenac 50 MG Oral Tab EC; Take 1 tablet (50 mg total) by mouth 2 (two) times daily as needed.  Dispense: 60 tablet; Refill: 0  - Physiatry Referral - In Network    Patient aware of plan of care. All questions answered to satisfaction of the patient. Patient instructed to call office or reach out via Jibet if any issues arise. For urgent issues and/or reviewed red flags please proceed to the urgent care or ER.  Also, inform the nurse practitioner with any new symptoms or medication side effects.      Return in about 1 month (around 1/20/2025).    MAMADOU Mccall, 12/19/2024, 12:34 PM

## 2024-12-20 ENCOUNTER — HOSPITAL ENCOUNTER (OUTPATIENT)
Dept: GENERAL RADIOLOGY | Age: 54
Discharge: HOME | End: 2024-12-20
Payer: COMMERCIAL

## 2024-12-20 ENCOUNTER — TELEPHONE (OUTPATIENT)
Dept: FAMILY MEDICINE CLINIC | Facility: CLINIC | Age: 54
End: 2024-12-20

## 2024-12-20 ENCOUNTER — OFFICE VISIT (OUTPATIENT)
Dept: FAMILY MEDICINE CLINIC | Facility: CLINIC | Age: 54
End: 2024-12-20

## 2024-12-20 VITALS
BODY MASS INDEX: 27.08 KG/M2 | SYSTOLIC BLOOD PRESSURE: 123 MMHG | HEIGHT: 62 IN | WEIGHT: 147.19 LBS | OXYGEN SATURATION: 97 % | HEART RATE: 80 BPM | DIASTOLIC BLOOD PRESSURE: 85 MMHG

## 2024-12-20 DIAGNOSIS — S16.1XXD NECK STRAIN, SUBSEQUENT ENCOUNTER: ICD-10-CM

## 2024-12-20 DIAGNOSIS — M54.2 NECK PAIN: Primary | ICD-10-CM

## 2024-12-20 DIAGNOSIS — M54.2 NECK PAIN: ICD-10-CM

## 2024-12-20 PROCEDURE — 3074F SYST BP LT 130 MM HG: CPT

## 2024-12-20 PROCEDURE — 99213 OFFICE O/P EST LOW 20 MIN: CPT

## 2024-12-20 PROCEDURE — 3008F BODY MASS INDEX DOCD: CPT

## 2024-12-20 PROCEDURE — 72040 X-RAY EXAM NECK SPINE 2-3 VW: CPT

## 2024-12-20 PROCEDURE — 3079F DIAST BP 80-89 MM HG: CPT

## 2024-12-20 RX ORDER — TIZANIDINE HYDROCHLORIDE 2 MG/1
2 CAPSULE, GELATIN COATED ORAL 3 TIMES DAILY PRN
Qty: 42 CAPSULE | Refills: 0 | Status: SHIPPED | OUTPATIENT
Start: 2024-12-20 | End: 2025-01-03

## 2024-12-20 RX ORDER — METHYLPREDNISOLONE 4 MG/1
TABLET ORAL
Qty: 21 EACH | Refills: 0 | Status: SHIPPED | OUTPATIENT
Start: 2024-12-20

## 2024-12-20 NOTE — TELEPHONE ENCOUNTER
Patient states she is returning a call from her doctor's office. No message seen in chart. Patient has appointment today with MAMADOU Monzon.    Future Appointments   Date Time Provider Department Center   12/20/2024  3:40 PM Nicolás Kaur APRN [facility]  [facility]

## 2024-12-27 RX ORDER — TRAZODONE HYDROCHLORIDE 100 MG/1
100 TABLET ORAL NIGHTLY
Qty: 90 TABLET | Refills: 0 | Status: SHIPPED | OUTPATIENT
Start: 2024-12-27

## 2024-12-27 NOTE — TELEPHONE ENCOUNTER
Please review; protocol failed/ has no protocol    Requested Prescriptions   Pending Prescriptions Disp Refills    TRAZODONE 100 MG Oral Tab [Pharmacy Med Name: TRAZODONE 100 MG TABLET] 30 tablet 8     Sig: TAKE 1 TABLET BY MOUTH EVERY NIGHT       There is no refill protocol information for this order        Recent Outpatient Visits              1 week ago Neck pain    [facility] Nicolás Kaur, APRN    Office Visit    1 month ago Neck pain    [facility] [place], PT    Office Visit    1 month ago Neck pain    [facility] [place], PT    Office Visit    2 months ago Neck pain    [facility] [place], PT    Office Visit    2 months ago Routine medical exam    [facility], Tesha Salgado MD    Office Visit          Future Appointments         Provider Department Appt Notes    In 1 month Neel Morales MD [facility] Left sided neck pain

## 2025-01-17 DIAGNOSIS — S16.1XXD NECK STRAIN, SUBSEQUENT ENCOUNTER: ICD-10-CM

## 2025-01-17 DIAGNOSIS — M54.2 NECK PAIN: ICD-10-CM

## 2025-01-21 NOTE — TELEPHONE ENCOUNTER
Patient was in office 12/2024. Prescribe for pain. Directed to follow up in a month. No appointments scheduled with family medicine. Patient seeing Physical Medicine and Rehabilitation in one month. Please advise on refill

## 2025-02-19 ENCOUNTER — OFFICE VISIT (OUTPATIENT)
Dept: PHYSICAL MEDICINE AND REHAB | Facility: CLINIC | Age: 55
End: 2025-02-19
Payer: COMMERCIAL

## 2025-02-19 VITALS — BODY MASS INDEX: 26.13 KG/M2 | WEIGHT: 142 LBS | HEIGHT: 62 IN

## 2025-02-19 DIAGNOSIS — M79.18 MYOFASCIAL PAIN: Primary | ICD-10-CM

## 2025-02-19 DIAGNOSIS — M25.561 CHRONIC PAIN OF RIGHT KNEE: ICD-10-CM

## 2025-02-19 DIAGNOSIS — M47.812 CERVICAL SPONDYLOSIS: ICD-10-CM

## 2025-02-19 DIAGNOSIS — G89.29 CHRONIC PAIN OF RIGHT KNEE: ICD-10-CM

## 2025-02-19 PROCEDURE — 99204 OFFICE O/P NEW MOD 45 MIN: CPT | Performed by: PHYSICAL MEDICINE & REHABILITATION

## 2025-02-19 PROCEDURE — 3008F BODY MASS INDEX DOCD: CPT | Performed by: PHYSICAL MEDICINE & REHABILITATION

## 2025-02-19 RX ORDER — CYCLOBENZAPRINE HCL 10 MG
10 TABLET ORAL NIGHTLY
Qty: 30 TABLET | Refills: 0 | Status: SHIPPED | OUTPATIENT
Start: 2025-02-19 | End: 2025-03-21

## 2025-02-19 NOTE — PROGRESS NOTES
[facility]  Progress Note    CHIEF COMPLAINT:    Chief Complaint   Patient presents with    Neck Pain     New right handed patient c/o constant left sided neck pain that started in  without injury. States she woke up with a stiff neck, pain has become sharp. Tried PT, flexeril, medrol pack all without relief. States PT made her pain worse. Denies N/T, states she does notice increased pain when using her left arm. Cervical xray 24. LOP 3/10       History of Present Illness:  Al Noonan is a 54 year old female who is being seen in consultation at the request of Dr. Enriquez.  She has a chief complaint of left-sided neck pain starting in September.  No injury.  She is an MRI tech.  Thus far she is been through physical therapy and has tried Flexeril, Medrol and NSAIDs.  PT actually made her pain worse.  No arm radiation.    PAST MEDICAL HISTORY:  Past Medical History:    Allergic rhinitis    Anal fissure    Anxiety    Anxiety state    Colitis    lymphocytic colitis    Hearing impairment    Irritable bowel syndrome    Migraines    Obesity    Osteoarthritis    PONV (postoperative nausea and vomiting)    Visual impairment       SURGICAL HISTORY:  Past Surgical History:   Procedure Laterality Date    Colonoscopy  2013    Lymphocytic colitis    Colonoscopy      Colonoscopy N/A 2022    Procedure: COLONOSCOPY;  Surgeon: Elio Mott MD;  Location: [facility] ENDOSCOPY    Hysterectomy            Total abdom hysterectomy         SOCIAL HISTORY:   Social History     Occupational History    Not on file   Tobacco Use    Smoking status: Former     Current packs/day: 0.00     Types: Cigarettes     Start date: 10/1/2016     Quit date: 10/1/2016     Years since quittin.3     Passive exposure: Never    Smokeless tobacco: Never   Vaping Use    Vaping status: Never Used   Substance and Sexual Activity    Alcohol use: Yes     Alcohol/week: 1.0  - 2.0 standard drink of alcohol     Types: 1 - 2 Standard drinks or equivalent per week     Comment: occ    Drug use: Never    Sexual activity: Not on file       CURRENT MEDICATIONS:   Current Outpatient Medications   Medication Sig Dispense Refill    cyclobenzaprine 10 MG Oral Tab Take 1 tablet (10 mg total) by mouth nightly. 30 tablet 0    DICLOFENAC 50 MG Oral Tab EC TAKE 1 TABLET BY MOUTH 2 TIMES DAILY AS NEEDED. 60 tablet 0    traZODone 100 MG Oral Tab Take 1 tablet (100 mg total) by mouth nightly. 90 tablet 0    methylPREDNISolone (MEDROL) 4 MG Oral Tablet Therapy Pack As directed. 21 each 0    escitalopram 20 MG Oral Tab Take 1 tablet (20 mg total) by mouth daily. 90 tablet 4    ubrogepant (UBRELVY) 100 MG Oral Tab Take one tablet at onset of migraine.  May take additional tablet in 2 hours if needed.  Do not exceed two tablets per 24 hour period. 10 tablet 5    ZEPBOUND 7.5 MG/0.5ML Subcutaneous Solution Auto-injector Inject 7.5 mg into the skin once a week.      ubrogepant (UBRELVY) 100 MG Oral Tab Take one tablet at onset of migraine.  May take additional tablet in 2 hours if needed.  Do not exceed two tablets per 24 hour period. 3 tablet 0    Estradiol 10 MCG Vaginal Tab PLACE 1 TABLET VAGINALLY TWICE A WEEK ON MONDAY AND THURSDAY      clonazePAM (KLONOPIN OR) Take by mouth as needed.         ALLERGIES:   Allergies[1]        PHYSICAL EXAM:   Ht 62\"   Wt 142 lb (64.4 kg)   BMI 25.97 kg/m²     Body mass index is 25.97 kg/m².      General: No immediate distress  Head: Normocephalic/ Atraumatic  Extremities: No upper extremity edema bilaterally. Peripheral pulses intact.  Spine:  full and painfree cervical ROM in all directions, tender particularly over the left scalenes, less so over the trapezius, some pain with left sidebending.  Shoulders: full and painfree ROM, no impingement signs  Neuro:   Cognition: alert & oriented x 3, attentive, able to follow 2 step commands, comprehention intact, spontaneous  speech intact  Strength: Upper extremities have 5/5 strength  Sensation: Normal upper extremities  Reflexes: Normal upper extremities  Spurling's sign: neg    Data    Radiology Imaging:  I personally reviewed a plain film x-ray of the cervical spine showing C5-6 disc degeneration, good alignment, minimal facet arthropathy at the cervical thoracic junction.    ASSESSMENT AND PLAN:  1. Myofascial pain  She seems to have a combination of myofascial pain and facet arthropathy.  Will try a different take on physical therapy.  Will also give her Flexeril for insomnia and muscle relaxation.  Return in 6 weeks.  - PHYSICAL THERAPY EXTERNAL    2. Cervical spondylosis  May have facet syndrome.  Will address per above.  If no better consider facet injections  - PHYSICAL THERAPY EXTERNAL    3. Chronic pain of right knee  Limited and painful knee flexion range of motion.  Will obtain a plain film x-ray  - XR KNEE (3 VIEWS), RIGHT (CPT=73562); Future        RTC: 6 weeks      The patient was in agreement with the assessment and plan.  All questions were answered.         Neel Morales MD  Physical Medicine and Rehabilitation/Sports Medicine  [facility]           [1]   Allergies  Allergen Reactions    Tetanus Toxoid SWELLING    Diphth-Acell Pertussis-Tetanus ITCHING     Swelling erythema at site of vaccine.

## 2025-02-27 ENCOUNTER — HOSPITAL ENCOUNTER (OUTPATIENT)
Dept: GENERAL RADIOLOGY | Age: 55
Discharge: HOME | End: 2025-02-27
Attending: PHYSICAL MEDICINE & REHABILITATION
Payer: COMMERCIAL

## 2025-02-27 DIAGNOSIS — M25.561 CHRONIC PAIN OF RIGHT KNEE: ICD-10-CM

## 2025-02-27 DIAGNOSIS — G89.29 CHRONIC PAIN OF RIGHT KNEE: ICD-10-CM

## 2025-02-27 PROCEDURE — 73562 X-RAY EXAM OF KNEE 3: CPT | Performed by: PHYSICAL MEDICINE & REHABILITATION

## 2025-03-03 ENCOUNTER — HOSPITAL ENCOUNTER (EMERGENCY)
Facility: HOSPITAL | Age: 55
Discharge: LEFT BEFORE DISCHARGE | End: 2025-03-03
Payer: COMMERCIAL

## 2025-03-03 VITALS
HEART RATE: 100 BPM | OXYGEN SATURATION: 98 % | DIASTOLIC BLOOD PRESSURE: 82 MMHG | SYSTOLIC BLOOD PRESSURE: 123 MMHG | RESPIRATION RATE: 18 BRPM | TEMPERATURE: 98 F | WEIGHT: 140 LBS | BODY MASS INDEX: 26 KG/M2

## 2025-03-06 ENCOUNTER — PATIENT MESSAGE (OUTPATIENT)
Dept: PHYSICAL MEDICINE AND REHAB | Facility: CLINIC | Age: 55
End: 2025-03-06

## 2025-05-29 ENCOUNTER — NURSE TRIAGE (OUTPATIENT)
Dept: FAMILY MEDICINE CLINIC | Facility: CLINIC | Age: 55
End: 2025-05-29

## 2025-05-29 NOTE — TELEPHONE ENCOUNTER
Patient booked via invi        Message    Appointment for: Al Noonan ([id number])   Visit type: MYCHART EXAM (2964)   6/3/2025 3:40 PM (20 minutes) with Nicolás Kaur in [facility]      Patient comments:   Anxiety

## 2025-05-29 NOTE — TELEPHONE ENCOUNTER
Action Requested: Summary for Provider     []  Critical Lab, Recommendations Needed  [] Need Additional Advice  []   FYI    []   Need Orders  [] Need Medications Sent to Pharmacy  []  Other     SUMMARY: Appointment warranted    Reason for call: Anxiety    Called and spoke with patient and identified full name and date of birth.  Patient stated has history of anxiety and panic attacks for several years, but over past 2 weeks are more frequent  Is managing them thus far with yoga, but wants appointment to be evaluated  Appointment scheduled as below.  Advised to call back if symptoms progress or go to . Voiced understanding    Future Appointments   Date Time Provider Department Center   6/3/2025  3:40 PM Nicolás Kaur APRN [facility]       Reason for Disposition   MODERATE anxiety (e.g., persistent or frequent anxiety symptoms; interferes with sleep, school, or work)    Protocols used: Anxiety and Panic Attack-A-[place]

## 2025-06-03 ENCOUNTER — LAB ENCOUNTER (OUTPATIENT)
Dept: LAB | Age: 55
End: 2025-06-03
Payer: COMMERCIAL

## 2025-06-03 DIAGNOSIS — F41.1 ANXIETY STATE: ICD-10-CM

## 2025-06-03 LAB
TSI SER-ACNC: 1.44 UIU/ML (ref 0.55–4.78)
VIT D+METAB SERPL-MCNC: 33.6 NG/ML (ref 30–100)

## 2025-06-03 PROCEDURE — 84443 ASSAY THYROID STIM HORMONE: CPT

## 2025-06-03 PROCEDURE — 36415 COLL VENOUS BLD VENIPUNCTURE: CPT

## 2025-06-03 PROCEDURE — 82306 VITAMIN D 25 HYDROXY: CPT

## (undated) DIAGNOSIS — Z12.11 COLON CANCER SCREENING: Primary | ICD-10-CM

## (undated) DEVICE — LINE MNTR ADLT SET O2 INTMD

## (undated) DEVICE — STERILE LATEX POWDER-FREE SURGICAL GLOVESWITH NITRILE COATING: Brand: PROTEXIS

## (undated) DEVICE — KIT CLEAN ENDOKIT 1.1OZ GOWNX2

## (undated) DEVICE — KIT ENDO ORCAPOD 160/180/190

## (undated) DEVICE — FORCEP RADIAL JAW 4

## (undated) DEVICE — 35 ML SYRINGE REGULAR TIP: Brand: MONOJECT

## (undated) DEVICE — MEDI-VAC NON-CONDUCTIVE SUCTION TUBING 6MM X 1.8M (6FT.) L: Brand: CARDINAL HEALTH

## (undated) NOTE — LETTER
[address], [place]  Authorization for Invasive Procedures  1. I hereby authorize Dr. Reddy De , my physician and whomever may be designated as the doctor's assistant, to perform the following operation and/or procedure:  Colonoscopy on Loc Mcmullen at [facility].    2. My physician has explained to me the nature and purpose of the operation or other procedure, possible alternative methods of treatment, the risks involved and the possibility of complications to me. I understand the probable consequences of declining the recommended procedure and the alternative methods of treatment. I acknowledge that no guarantee has been made as to the result that may be obtained. 3. I recognize that during the course of this operation or other procedure, unforeseen conditions may necessitate additional or different procedures than those listed above. I, therefore, further authorize and request that the above-named physician, his/her physician assistants, or designees perform such procedures as are, in his/her professional opinion, necessary and desirable. If I have a Do Not Attempt Resuscitation (DNAR) order in place, that status will be suspended while in the operating room, procedural suite, and during the recovery period unless otherwise explicitly stated by me (or a person authorized to consent on my behalf). The surgeon or my attending physician will determine when the applicable recovery period ends for purposes of reinstating the DNAR order. 4. Should the need arise during my operation or immediate post-operative period; I also consent to the administration of blood and/or blood products.  Further, I understand that despite careful testing and screening of blood and blood products, I may still be subject to ill effects as a result of recieving a blood transfusion an/or blood producst. The following are some, but not all, of the potential risks that can occur: fever and allergic reactions, hemolytic reactions, transmission of disease such as hepatitis, AIDS, cytomegalovirus (CMV), and flluid overload. In the event that I wish to have autologous transfusions of my own blood, or a directed donor transfusion, I will discuss this with my physician. 5. I consent to the photographing of the operations or procedures to be performed for the purposes of advancing medicine, science, and/or education, provided my identity is not revealed. If the procedure has been videotaped, the physician/surgeon will obtain the original videotape. The hospital will not be responsible for storage or maintenance of this tape. 6. I consent to the presence of a  or observer as deemed necessary by my physician or his designee. 7. Any tissues or organs removed in the operation or other procedure may be disposed of by and at the discretion of [facility].    8. I understand that the physician and his/her physician assistants may not be employees or agents of [facility], [facility], [facility], but are independent medical practitioners who have been permitted to use its facilities for the care and treatment of their patients. 9. Patients having a sterilization procedure: I understand that if the procedure is successful the results will be permanent and it will therefore be impossible for me to inseminate, conceive or bear children. I also understand that the procedure is intended to result in sterility, although the result has not been guaranteed. 10. I CERTIFY THAT I HAVE READ AND FULLY UNDERSTAND THE ABOVE CONSENT TO OPERATION and/or OTHER PROCEDURE. 11. I acknowledge that my physician has explained sedation/analgesia administration to me including the risks and benefits. I consent to the administration of sedation/analgesia as may be necessary or desirable in the judgment of my physician.      Signature of Patient:  ________________________________________________ Date: _________Time: _________    Responsible person in case of minor or unconscious: _____________________________Relationship: ____________     Witness Signature: ____________________________________________ Date: __________ Time: ___________    Statement of Physician  My signature below affirms that prior to the time of the procedure, I have explained to the patient and/or her legal representative, the risks and benefits involved in the proposed treatment and any reasonable alternative to the proposed treatment. I have also explained the risks and benefits involved in the refusal of the proposed treatment and have answered the patient's questions. If I have a significant financial interest in this procedure/surgery, I have disclosed this and had a discussion with my patient.     Signature of Physician:   ________________________________________Date: _________Time:_______ Patient Name: Tru Albert  : 1970   Printed: 2022    Medical Record #: [id number]

## (undated) NOTE — LETTER
WHERE IS YOUR PAIN NOW?  Aidne the areas on your body where you feel the described sensations.  Use the appropriate symbol.  Aiden the areas of radiation.  Include all affected areas.  Just to complete the picture, please draw in the face.     ACHE:  ^ ^ ^   NUMBNESS:  0000   PINS & NEEDLES:  = = = =                              ^ ^ ^                       0000              = = = =                                    ^ ^ ^                       0000            = = = =      BURNING:  XXXX   STABBING: ////                  XXXX                ////                         XXXX          ////     Please aiden the line below indicating your degree of pain right now  with 0 being no pain 10 being the worst pain possible.                                         0             1             2              3             4              5              6              7             8             9             10         Patient Signature: